# Patient Record
Sex: FEMALE | Race: WHITE | Employment: UNEMPLOYED
[De-identification: names, ages, dates, MRNs, and addresses within clinical notes are randomized per-mention and may not be internally consistent; named-entity substitution may affect disease eponyms.]

---

## 2017-11-12 ENCOUNTER — HEALTH MAINTENANCE LETTER (OUTPATIENT)
Age: 52
End: 2017-11-12

## 2019-04-22 ENCOUNTER — HOSPITAL ENCOUNTER (OUTPATIENT)
Dept: WOUND CARE | Facility: CLINIC | Age: 54
Discharge: HOME | End: 2019-04-22
Attending: SURGERY | Admitting: SURGERY
Payer: COMMERCIAL

## 2019-04-22 VITALS
DIASTOLIC BLOOD PRESSURE: 91 MMHG | RESPIRATION RATE: 16 BRPM | HEIGHT: 65 IN | HEART RATE: 103 BPM | BODY MASS INDEX: 25.43 KG/M2 | WEIGHT: 152.6 LBS | TEMPERATURE: 99 F | SYSTOLIC BLOOD PRESSURE: 158 MMHG

## 2019-04-22 DIAGNOSIS — S81.801A: ICD-10-CM

## 2019-04-22 DIAGNOSIS — S81.801A TRAUMATIC OPEN WOUND OF RIGHT LOWER LEG, INITIAL ENCOUNTER: ICD-10-CM

## 2019-04-22 DIAGNOSIS — S81.801S TRAUMATIC OPEN WOUND OF RIGHT LOWER LEG, SEQUELA: ICD-10-CM

## 2019-04-22 DIAGNOSIS — S81.801A TRAUMATIC OPEN WOUND OF LOWER LEG, RIGHT, INITIAL ENCOUNTER: ICD-10-CM

## 2019-04-22 PROCEDURE — 11042 DBRDMT SUBQ TIS 1ST 20SQCM/<: CPT

## 2019-04-22 PROCEDURE — 11042 DBRDMT SUBQ TIS 1ST 20SQCM/<: CPT | Performed by: SURGERY

## 2019-04-22 PROCEDURE — A6235 HYDROCOLLD DRG >16<=48 W/O B: HCPCS

## 2019-04-22 PROCEDURE — G0463 HOSPITAL OUTPT CLINIC VISIT: HCPCS | Mod: 25

## 2019-04-22 PROCEDURE — 99203 OFFICE O/P NEW LOW 30 MIN: CPT | Mod: 25 | Performed by: SURGERY

## 2019-04-22 PROCEDURE — 93923 UPR/LXTR ART STDY 3+ LVLS: CPT

## 2019-04-22 RX ORDER — MUPIROCIN 20 MG/G
OINTMENT TOPICAL
Refills: 2 | COMMUNITY
Start: 2019-04-17 | End: 2021-07-26

## 2019-04-22 ASSESSMENT — MIFFLIN-ST. JEOR: SCORE: 1293.07

## 2019-04-22 NOTE — PROGRESS NOTES
"[facility] [place] Progress Note    Subject: Dilma Navarro was referred by Dr. Castillo for evaluation of the right posterior calf traumatic ulcer.  This 53-year-old patient overall excellent health with no underlying PAD or venous issues in her right leg [place] airport [place] and was hit by a luggage cart in the back of her calf.  She was wearing long pants and had significant bruising but no open wounds.  This was sore and swollen but no evidence of infection.  Approximately 3 weeks ago while scrubbing the area with a Loofa to small ulcers develop.  She saw dermatology who recommended Bactroban to the area and to be evaluated in our clinic.    PMH: Medications: Vitamins including magnesium, vitamin D3, calcium            Medical: Left calf DVT 2012following injury to her left ankle treated with a splint                               Short term of anticoagulation with no complications except for                               Mild chronic left distal calf and ankle edema for which she wears                                          Compression.                            Cholecystectomy-laparoscopic with no complications                          Cushing's disease.  Underwent trans-sphenoid resection in 2005                                    No chronic steroids.    12 point review of systems is unremarkable.  No history of diabetes, PAD, varicose veins.  History of DVT following trauma as described above with no clinical significance.    PMH: Noncontributory    Non-smoker.  Social history: As a home here in the [place] but also in [facility].  She plans on leaving for [place] for a 1 month stay later this week.                                Exam:  BP (!) 158/91   Pulse 103   Temp 99  F (37.2  C) (Temporal)   Resp 16   Ht 1.651 m (5' 5\")   Wt 69.2 kg (152 lb 9.6 oz)   BMI 25.39 kg/m    Alert and appropriate.  Normal affect.  Very conversant.  HEENT= normal  Chest= " clear to auscultation  Cardiovascular= regular rate with no murmur  Extremities= contused area in the right distal one third of posterior calf.  First ulcer measures 1 x 1.1 x 0.1 cm with the distal measuring 0.5 x 0.3 x 0.1 cm.  There is chronic bruising type changes particularly around the smaller distal ulcer.  However, no significant induration.  No cellulitis.  No edema in this leg.  +3 palpable dorsalis pedis pulse bilaterally.         Bedside Doppler triphasic waveforms in the dorsalis pedis and posterior tibial arteries bilaterally    We felt that excisional removal is indicated due to the chronicity of the ulcer.    Procedure:   Patient was determined to be capable of making their own medical decisions and informed consent was obtained. Topical anesthetic of 4% lidocaine was applied, area was prepped with Betadine and a field block of 10 mils of 0.5% plain Marcaine was performed with excellent results.  Debridement was performed using a #15 blade down to and including subcutaneous tissue.  We excised the nonviable eschar on both of the sites down to the superficial subcutaneous tissue with excellent bleeding.  Bleeding controlled with light pressure. Patient tolerated procedure well.    Proximal ulcer measures 1.5 x 1.6 x 0.2 cm and distal 0.8 x 0.6 x 0.2 cm post debridement.    Impression: Traumatic posterior calf ulcerations.  We have excised the obviously nonviable tissue.  I do have some concerns about the bruising particularly on the more distal site.  With the patient leaving for [place] later this week we felt that the debridement we performed today would be adequate to see if of the heel.  If the contused tissue cannot heal much wider debridement would be necessary and this would be reevaluated when she returns in 4 weeks since the wider excisional debridement would require very likely wound VAC and possibly skin grafting.  However, with her excellent blood supply, no significant venous or swelling  issues, and good compliance very likely that this may improve.    We will use Aquacel Ag over the sites followed by a Spandagrip or her compression stocking.  She may shower but not soak the wound.  She will apply the dressings daily.  No specific restrictions to activities as long as she is ambulatory.  No nutritional supplements would be necessary with this patient.    Plan: We will dress the wounds with Aquacel Ag.  If she does run out of this dressing for which we gave her 2 packets that should last multiple weeks she will go back to the Bactroban..  Patient will return to the clinic in 4 weeks time    Giovanni Salazar MD on 4/22/2019 at 8:22 AM

## 2019-04-22 NOTE — PROGRESS NOTES
Patient arrived for wound care visit. Certified Wound Care Nurse time spent evaluating patient record, completed a full evaluation and documented wound(s) & lexi-wound skin; provided recommendation based on treatment plan. Applied dressing, reviewed discharge instructions, patient education, and discussed plan of care with appropriate medical team staff members and patient and/or family members.

## 2019-04-22 NOTE — DISCHARGE INSTRUCTIONS
[facility]  [address] [facility] [address], [place] [address]  Appointment Phone [phone] Nurse Advisors [phone]    Dilma Navarro      1965    Wound Dressing Change to two wounds on the back of right leg  Cleanse wounds with soap and water, may shower   Cover wounds with Aquacel AG cut to fit the size of the wound (moisten with saline if dry wound). When you run out of the Aquacel AG you may resume your Mupirocin.  Cover wounds with gauze then wrap with roll gauze  Change dressing daily    Compression:   You have a compression wrap Spandagrip E is supposed to be removed at night and put back on first thing in the morning.   Please remove compression dressing if toes turn blue and/or tingle and can not be relieved by raising the leg for one hour.   Please raise your legs above your heart for 30 mins 3 times a day to promote wound healing.    A diet high in protein is important for wound healing, we recommend getting 90 grams of protein per day. Taking protein shakes or bars are a good way to get extra protein in your diet.     Good sources of protein:  Pork 26g per 3 oz  Whey protein powder - 24g per scoop (on average)  Greek yogurt - 23g per 8oz   Chicken or Turkey - 23g per 3oz  Fish - 20-25g per 3oz  Beef - 18-23g per 3oz  Navy beans - 20g per cup  Cottage cheese - 14g per 1/2 cup   Lentils - 13g per 1/4 cup  Beef jerky 13g per 1oz  2% milk - 8g per cup  Peanut butter - 8g per 2 tablespoons  Eggs - 6g per egg  Mixed nuts - 6g per 2oz     Giovanni Salazar M.D. April 22, 2019    Call us at [phone] if you have any questions about your wounds, have redness or swelling around your wound, have a fever of 101 or greater or if you have any other problems or concerns. We answer the phone Monday through Friday 8 am to 4 pm, please leave a message as we check the voicemail frequently throughout the day.     Follow up with Dr. Salazar when you get back in one  month

## 2019-05-01 ENCOUNTER — TELEPHONE (OUTPATIENT)
Dept: WOUND CARE | Facility: CLINIC | Age: 54
End: 2019-05-01

## 2019-05-01 NOTE — TELEPHONE ENCOUNTER
Patient called earlier this week wondering about the aquacel ag drying out. Advised Patient to moisten with saline and continue with plan of care.  Patient calls today to request to send picture of wound as it is now red with a little yellow on the area. She wants to know if this is normal as [place]. Advised to attempt to remove yellow with washing of wound and continue with current treatment this is an expected outcome. We are not able to accept pictures at this time due to [place].

## 2019-06-03 ENCOUNTER — HOSPITAL ENCOUNTER (OUTPATIENT)
Dept: WOUND CARE | Facility: CLINIC | Age: 54
Discharge: HOME | End: 2019-06-03
Attending: PHYSICIAN ASSISTANT | Admitting: PHYSICIAN ASSISTANT
Payer: COMMERCIAL

## 2019-06-03 VITALS
RESPIRATION RATE: 18 BRPM | DIASTOLIC BLOOD PRESSURE: 110 MMHG | TEMPERATURE: 98.1 F | HEART RATE: 102 BPM | SYSTOLIC BLOOD PRESSURE: 179 MMHG

## 2019-06-03 DIAGNOSIS — S81.801S TRAUMATIC OPEN WOUND OF RIGHT LOWER LEG, SEQUELA: ICD-10-CM

## 2019-06-03 PROCEDURE — 11042 DBRDMT SUBQ TIS 1ST 20SQCM/<: CPT

## 2019-06-03 PROCEDURE — A6235 HYDROCOLLD DRG >16<=48 W/O B: HCPCS

## 2019-06-03 PROCEDURE — 11042 DBRDMT SUBQ TIS 1ST 20SQCM/<: CPT | Performed by: SURGERY

## 2019-06-03 NOTE — PROGRESS NOTES
[facility] [place] Progress Note    Subject: Dilma Navarro returns for her first follow-up.  We saw her initially for posterior calf ulcers that were traumatic in nature on 4/22/2019.  Evidence of excellent blood supply and no venous issues.  Wounds were debrided that time and she was started on Aquacel Ag.    She was leaving for [place] so shortly after the visit and just returned to her home in the [facility].  She ran out of Aquacel and was using Bactroban that she got in [place].  She has been using a Spandagrip but has had no swelling issues.    Patient Active Problem List   Diagnosis     Blood clot in vein     Stress fracture of foot     CARDIOVASCULAR SCREENING; LDL GOAL LESS THAN 160     Cushing's syndrome (H)     Traumatic open wound of right lower leg     Past Medical History:   Diagnosis Date     Blood clot in vein 09/18/2012    2 blood clots in L calf     Cushing's syndrome (H) 2005     Exam:  BP (!) 179/110   Pulse 102   Temp 98.1  F (36.7  C) (Temporal)   Resp 18   Wound (used by OP [facility] only) 04/22/19 0817 Right posterior;lower leg ulceration, venous (Active)   Length (cm) 1.5 6/3/2019 10:15 AM   Width (cm) 1.5 6/3/2019 10:15 AM   Depth (cm) 0.2 6/3/2019 10:15 AM   Wound (cm^2) 2.25 cm^2 6/3/2019 10:15 AM   Wound Volume (cm^3) 0.45 cm^3 6/3/2019 10:15 AM   Wound healing % -104.55 6/3/2019 10:15 AM   Dressing Appearance moist drainage 6/3/2019 10:15 AM   Drainage Characteristics/Odor serosanguineous 6/3/2019 10:15 AM   Drainage Amount moderate 6/3/2019 10:15 AM       Wound (used by OP [facility] only) 04/22/19 0817 Right lower;posterior;distal leg ulceration, venous (Active)   Length (cm) 2.5 6/3/2019 10:15 AM   Width (cm) 2.5 6/3/2019 10:15 AM   Depth (cm) 0.2 6/3/2019 10:15 AM   Wound (cm^2) 6.25 cm^2 6/3/2019 10:15 AM   Wound Volume (cm^3) 1.25 cm^3 6/3/2019 10:15 AM   Wound healing % -4066.67 6/3/2019 10:15 AM   Dressing Appearance moist drainage 6/3/2019 10:15 AM   Drainage  Characteristics/Odor serosanguineous 6/3/2019 10:15 AM   Drainage Amount moderate 6/3/2019 10:15 AM     Alert and appropriate.  Very comfortable.  No edema.  No cellulitis.  Palpable dorsalis pedis pulse.  Proximal distal calf wound measures 1.5 x 1.5 x 0.2 cm.  Good granulation tissue and a mild amount of slough.  No undermining.  The adjacent distal wound has actually somewhat increased.  This most likely is due to the crush tissue that was nonviable.  This particular wound has more nonviable tissue.  I again no undermining or cellulitis.  Surrounding skin tissue actually does look good.    Procedure:   Patient was determined to be capable of making their own medical decisions and informed consent was obtained. Topical anesthetic of 4% lidocaine was applied, debridement was performed using a #15 blade down to and including subcutaneous tissue.  All slough and fibrin removed from both wounds.  Nonviable tissue more aggressively debrided on the distal wound and skin edges trimmed 0.1 cm circumferentially.  Good bleeding was noted.  No undermining.  Granulation tissue the bed actually is quite firm.  Bleeding controlled with light pressure. Patient tolerated procedure well.    Impression: Some enlargement of the distal wound.  We have excised all nonviable tissue.  Granulation tissue is healthy with good perfusion and no significant edema.  We will use Aquacel Ag on the wounds until supply we gave her today is finished.  Ventricle back to Bactroban.  We will see her again in approximately 2 weeks.  The Spandagrip is optional.  She may shower but should not soak.  Nutritional intake is already good.    Plan: We will dress the wounds with Aquacel Ag changing over to Bactroban.  Depending on the ulcer on the next visit we will decide whether a collagen is indicated..  Patient will return to the clinic in 2 weeks time    Giovanni Salazar MD on 6/3/2019 at 10:54 AM

## 2019-06-03 NOTE — DISCHARGE INSTRUCTIONS
[facility]  [address] [facility] [address] [address]  Appointment Phone [phone] Nurse Advisors [phone]    Dilma Navarro      1965    Wound Dressing Change to right lower leg ulcer  Cleanse wound and surrounding skin with soap and water  May apply Dermaliour ointment to surrounding skin  Cover wound with Aquacel AG cover with Medipore pad  Change dressing daily    A diet high in protein is important for wound healing, we recommend getting 90 grams of protein per day. Taking protein shakes or bars are a good way to get extra protein in your diet.     Good sources of protein:  Pork 26g per 3 oz  Whey protein powder - 24g per scoop (on average)  Greek yogurt - 23g per 8oz   Chicken or Turkey - 23g per 3oz  Fish - 20-25g per 3oz  Beef - 18-23g per 3oz  Navy beans - 20g per cup  Cottage cheese - 14g per 1/2 cup   Lentils - 13g per 1/4 cup  Beef jerky 13g per 1oz  2% milk - 8g per cup  Peanut butter - 8g per 2 tablespoons  Eggs - 6g per egg  Mixed nuts - 6g per 2oz     Giovanni Salazar M.D.. Holley 3, 2019    Call us at [phone] if you have any questions about your wounds, have redness or swelling around your wound, have a fever of 101 or greater or if you have any other problems or concerns. We answer the phone Monday through Friday 8 am to 4 pm, please leave a message as we check the voicemail frequently throughout the day.     Follow up with Dr. Salazar in 2 weeks

## 2019-06-17 ENCOUNTER — HOSPITAL ENCOUNTER (OUTPATIENT)
Dept: WOUND CARE | Facility: CLINIC | Age: 54
Discharge: HOME | End: 2019-06-17
Attending: PHYSICIAN ASSISTANT | Admitting: PHYSICIAN ASSISTANT
Payer: COMMERCIAL

## 2019-06-17 VITALS
DIASTOLIC BLOOD PRESSURE: 92 MMHG | TEMPERATURE: 98.4 F | RESPIRATION RATE: 14 BRPM | HEART RATE: 96 BPM | SYSTOLIC BLOOD PRESSURE: 152 MMHG

## 2019-06-17 DIAGNOSIS — S81.801S TRAUMATIC OPEN WOUND OF RIGHT LOWER LEG, SEQUELA: ICD-10-CM

## 2019-06-17 PROCEDURE — A6022 COLLAGEN DRSG>16<=48 SQ IN: HCPCS

## 2019-06-17 PROCEDURE — 11042 DBRDMT SUBQ TIS 1ST 20SQCM/<: CPT

## 2019-06-17 PROCEDURE — 11042 DBRDMT SUBQ TIS 1ST 20SQCM/<: CPT | Performed by: SURGERY

## 2019-06-17 NOTE — PROGRESS NOTES
[facility] Wound [facility] [place] Progress Note    Subject: Dilma Navarro returns for follow-up of her right posterior calf ulcers.  This is related to trauma.  She has no underlying arterial or venous insufficiency.  We saw her initially for 1 visit before she spent a month in [place].  When she returned 2 weeks ago we were able to do a more aggressive debridement again.  She had developed a second ulcer during the time she was gone.  She has been using Aquacel Ag and compression which has been helpful.    Patient Active Problem List   Diagnosis     Blood clot in vein     Stress fracture of foot     CARDIOVASCULAR SCREENING; LDL GOAL LESS THAN 160     Cushing's syndrome (H)     Traumatic open wound of right lower leg     Past Medical History:   Diagnosis Date     Blood clot in vein 09/18/2012    2 blood clots in L calf     Cushing's syndrome (H) 2005     Exam:  BP (!) 152/92   Pulse 96   Temp 98.4  F (36.9  C) (Tympanic)   Resp 14   Wound (used by OP [facility] only) 04/22/19 0817 Right posterior;lower leg ulceration, venous (Active)   Length (cm) 1 6/17/2019 10:00 AM   Width (cm) 0.8 6/17/2019 10:00 AM   Depth (cm) 0.1 6/17/2019 10:00 AM   Wound (cm^2) 0.8 cm^2 6/17/2019 10:00 AM   Wound Volume (cm^3) 0.08 cm^3 6/17/2019 10:00 AM   Wound healing % 27.27 6/17/2019 10:00 AM       Wound (used by OP [facility] only) 04/22/19 0817 Right lower;posterior;distal leg ulceration, venous (Active)   Length (cm) 2.1 6/17/2019 10:00 AM   Width (cm) 2.1 6/17/2019 10:00 AM   Depth (cm) 0.2 6/17/2019 10:00 AM   Wound (cm^2) 4.41 cm^2 6/17/2019 10:00 AM   Wound Volume (cm^3) 0.88 cm^3 6/17/2019 10:00 AM   Wound healing % -2840 6/17/2019 10:00 AM     No complaints.  No swelling or cellulitis to right leg.  2 ulcerations are noted.  Proximal continues to decrease now measuring 1 x 0.8 x 0.1 cm.  Distal measures 2.1 x 2.1 x 0.2 cm with more slough and fibrin and a small amount of whitish tissue noted reticular superiorly.  No  undermining.  Surrounding skin is normal.    Procedure:   Patient was determined to be capable of making their own medical decisions and informed consent was obtained. Topical anesthetic of 4% lidocaine was applied, debridement was performed using a #15 blade down to and including subcutaneous tissue.  All slough and fibrin removed from both of the wounds.  On the distal wound all whitish tissue excised.  Underlying tissue looks good.  Skin edges slightly excised circumferentially and the larger ulcer with good healthy epithelium and dermis noted.  Bleeding controlled with light pressure. Patient tolerated procedure well.    Impression: Continued improvement.  Aquac Ag several days ago and is been using Bactroban on both wounds.  On the very superficial proximal wound we will use Bactroban but go to a Fibracol moistened with saline to change this daily.  Discussed why we would use Fibracol on the need to keep it moist at all times.  She has no troubles at all with her own dressing changes.    Plan: We will dress the wounds with Fibracol on the larger ulcer and Bactroban on the almost healed proximal ulcer..  Patient will return to the clinic in 2 weeks time    Giovanni Salazar MD on 6/17/2019 at 11:16 AM

## 2019-06-17 NOTE — DISCHARGE INSTRUCTIONS
[facility]  [address] [facility] [address], [place] [address]  Appointment Phone [phone] Nurse Advisors [phone]    Dilma Navarro      1965    Wound Dressing Change to right posterior lower leg ulcer  Cleanse wound with soap and water  Cover wound with Fibracol cut to fit the size of the wound (moisten with saline if dry wound)  Cover wound with gauze  Change dressing daily    Apply Bactroban to top wound, cover with gauze and change daily     Giovanni Salazar M.D.. June 17, 2019    Call us at [phone] if you have any questions about your wounds, have redness or swelling around your wound, have a fever of 101 or greater or if you have any other problems or concerns. We answer the phone Monday through Friday 8 am to 4 pm, please leave a message as we check the voicemail frequently throughout the day.     Follow up with Dr. Salazar in 2 weeks

## 2019-07-01 ENCOUNTER — HOSPITAL ENCOUNTER (OUTPATIENT)
Dept: WOUND CARE | Facility: CLINIC | Age: 54
Discharge: HOME | End: 2019-07-01
Attending: SURGERY | Admitting: SURGERY
Payer: COMMERCIAL

## 2019-07-01 VITALS
RESPIRATION RATE: 16 BRPM | SYSTOLIC BLOOD PRESSURE: 155 MMHG | HEART RATE: 87 BPM | TEMPERATURE: 97.2 F | DIASTOLIC BLOOD PRESSURE: 97 MMHG

## 2019-07-01 DIAGNOSIS — S81.801S TRAUMATIC OPEN WOUND OF RIGHT LOWER LEG, SEQUELA: ICD-10-CM

## 2019-07-01 PROCEDURE — A6022 COLLAGEN DRSG>16<=48 SQ IN: HCPCS

## 2019-07-01 PROCEDURE — 11042 DBRDMT SUBQ TIS 1ST 20SQCM/<: CPT

## 2019-07-01 PROCEDURE — 11042 DBRDMT SUBQ TIS 1ST 20SQCM/<: CPT | Performed by: SURGERY

## 2019-07-01 NOTE — PROGRESS NOTES
[facility]ound [facility] [place] Progress Note    Subject: Dilma Navarro returns for follow-up of her traumatic left posterior calf ulcers.  She developed the 2 ulcers and the proximal is completely healed over.  The distal ulcer measured 2.1 x 2.1 x 0.2 cm on her last visit on 6/17/2019.  The proximal wound was smaller.  We treated the proximal wound with Bactroban and then went to a Fibracol on the larger wound.  She is changing this daily.  No specific complaints.    Patient Active Problem List   Diagnosis     Blood clot in vein     Stress fracture of foot     CARDIOVASCULAR SCREENING; LDL GOAL LESS THAN 160     Cushing's syndrome (H)     Traumatic open wound of right lower leg     Past Medical History:   Diagnosis Date     Blood clot in vein 09/18/2012    2 blood clots in L calf     Cushing's syndrome (H) 2005     Exam:  BP (!) 155/97   Pulse 87   Temp 97.2  F (36.2  C) (Tympanic)   Resp 16   Wound (used by OP [facility] only) 04/22/19 0817 Right lower;posterior;distal leg ulceration, venous (Active)   Length (cm) 2.4 7/1/2019 10:00 AM   Width (cm) 2 7/1/2019 10:00 AM   Depth (cm) 0.3 7/1/2019 10:00 AM   Wound (cm^2) 4.8 cm^2 7/1/2019 10:00 AM   Wound Volume (cm^3) 1.44 cm^3 7/1/2019 10:00 AM   Wound healing % -3100 7/1/2019 10:00 AM   Dressing Appearance moist drainage 7/1/2019 10:00 AM   Drainage Characteristics/Odor serosanguineous 7/1/2019 10:00 AM   Drainage Amount moderate 7/1/2019 10:00 AM     Alert and appropriate.  Very comfortable.  Proximal posterior calf ulcer is completely epithelialized over and looks excellent.  Distal ulcer has much better granulation tissue filling at least 90% of the wound and slightly less healthy at the 2 to 4 o'clock position where there is a thicker layer of whitish fibrin.  Skin edges are somewhat rolled on this edge.  Very slight undermining is noted.    Procedure:   Patient was determined to be capable of making their own medical decisions and informed consent was  obtained. Topical anesthetic of 4% lidocaine was applied, debridement was performed using a #15 blade down to and including subcutaneous tissue.  Excised all skin edges particular the 2 to 4 o'clock position so there was no undermining and fresh epithelium.  Good bleeding was noted.  All slough and fibrin removed from the entire base which has excellent firm leading granulation tissue.  Bleeding controlled with light pressure. Patient tolerated procedure well.    Impression: Overall continued improvement.  Re-debridement today particular the skin edges and base to encourage epithelialization now that the base has granulated.  Will continue with the daily Fibracol.  She is had to moisten this recently due to less drainage to activate the Fibracol with usually is completely reabsorbed when she changes this in the morning.    Plan: We will dress the wounds with Fibracol daily..  Patient will return to the clinic in 2 weeks time    Giovanni Salazar MD on 7/1/2019 at 10:42 AM

## 2019-07-01 NOTE — DISCHARGE INSTRUCTIONS
[facility]  [address] [facility] [address], [facility] [address]  Appointment Phone [phone] Nurse Advisors [phone]    Dilma Navarro 1965    Wound Dressing Change to right posterior lower leg ulcer  Cleanse wound with soap and water  Cover wound with Fibracol cut to fit the size of the wound (moisten with saline if dry  wound)  Cover wound with gauze  Change dressing daily    Giovanni Salazar M.D. July 1, 2019    Call us at [phone] if you have any questions about your wounds, have redness or  swelling around your wound, have a fever of 101 or greater or if you have any other  problems or concerns. We answer the phone Monday through Friday 8 am to 4 pm,  please leave a message as we check the voicemail frequently throughout the day.    Follow up with Dr. Salazar in 2 weeks   Bronchodilators neb prn  Pfts as OP.

## 2019-10-28 ENCOUNTER — TELEPHONE (OUTPATIENT)
Dept: WOUND CARE | Facility: CLINIC | Age: 54
End: 2019-10-28

## 2020-03-02 ENCOUNTER — HEALTH MAINTENANCE LETTER (OUTPATIENT)
Age: 55
End: 2020-03-02

## 2020-04-07 ENCOUNTER — TELEPHONE (OUTPATIENT)
Dept: WOUND CARE | Facility: CLINIC | Age: 55
End: 2020-04-07

## 2020-04-07 NOTE — TELEPHONE ENCOUNTER
Returned call, spoke with Brina.  Made appointment for 4/13/2020.  Expressed concern that wound may be infected, discussed signs and symptoms of infection.  Advised to seek further treatment at ED if feels wound is worsening.  Understanding expressed, no further questions at this time.

## 2020-04-07 NOTE — TELEPHONE ENCOUNTER
Wound like to speak to Dr Salazar regarding her new wound but does not want to wait for an appointment.  [phone]

## 2020-04-13 ENCOUNTER — HOSPITAL ENCOUNTER (OUTPATIENT)
Dept: WOUND CARE | Facility: CLINIC | Age: 55
Discharge: HOME | End: 2020-04-13
Attending: SURGERY | Admitting: SURGERY
Payer: COMMERCIAL

## 2020-04-13 VITALS
DIASTOLIC BLOOD PRESSURE: 94 MMHG | TEMPERATURE: 97.8 F | RESPIRATION RATE: 18 BRPM | SYSTOLIC BLOOD PRESSURE: 155 MMHG | HEART RATE: 112 BPM

## 2020-04-13 DIAGNOSIS — L97.922 ULCER OF LEFT LOWER EXTREMITY WITH FAT LAYER EXPOSED (H): ICD-10-CM

## 2020-04-13 PROBLEM — Z86.718 HISTORY OF DVT (DEEP VEIN THROMBOSIS): Status: ACTIVE | Noted: 2019-05-30

## 2020-04-13 PROBLEM — M85.80 OSTEOPENIA: Status: ACTIVE | Noted: 2019-05-30

## 2020-04-13 PROCEDURE — G0463 HOSPITAL OUTPT CLINIC VISIT: HCPCS | Mod: 25

## 2020-04-13 PROCEDURE — 99213 OFFICE O/P EST LOW 20 MIN: CPT | Mod: 25 | Performed by: SURGERY

## 2020-04-13 PROCEDURE — 11042 DBRDMT SUBQ TIS 1ST 20SQCM/<: CPT

## 2020-04-13 PROCEDURE — 11042 DBRDMT SUBQ TIS 1ST 20SQCM/<: CPT | Performed by: SURGERY

## 2020-04-13 NOTE — DISCHARGE INSTRUCTIONS
[facility]  [address] [address]    Call us at [phone] if you have any questions about your wounds, have redness or swelling around your wound, have a fever of 101 or greater or if you have any other problems or concerns. We answer the phone Monday through Friday 8 am to 4 pm, please leave a message as we check the voicemail frequently throughout the day.     Dilma Navarro      1965    Aquacel AG Wound Dressing Change: Left leg  Cleanse wound with soap and water   Apply critic aid to skin surrounding the wound (but not in the wound)  Cover wound with Aquacel AG cut to fit the size of the wound (moisten with saline if dry wound)  Wrap or cover wound with gauze, foam dressing, ABD pad  Change dressing daily         Giovanni Salazar M.D.. April 13, 2020  Appointments for you to make:  Wound Healing Clinic  Follow up with Provider - 1 week  [phone]

## 2020-04-13 NOTE — ADDENDUM NOTE
Encounter addended by: Veronica Miramontes RN on: 4/13/2020 12:06 PM   Actions taken: Diagnosis association updated, Order list changed

## 2020-04-13 NOTE — PROGRESS NOTES
[facility] Wound [facility]aling [place] Progress Note    Subject: Dilma Navarro is a 54-year-old patient overall very good health who we know from a traumatic ulceration on her right calf that is subsequently healed.  Last visit with us was on 10/28/2019 and she has had no further problems.    Ever, approximately 3 weeks ago she was pushing her grocery cart in a parking lot and hit a pothole where she slipped and fell suffering a traumatic oblique laceration to her left mid anterior calf.  Great deal of bleeding and bruising.  Went to urgent care where this was sutured.  Except for the bruising this looks fine and sutures removed by her primary care physician 1 week later.  Unfortunately, she developed further wound breakdown.  No signs of systemic sepsis.  Comes for follow-up and treatment at the wound clinic today.    Patient Active Problem List   Diagnosis     Blood clot in vein     Stress fracture of foot     CARDIOVASCULAR SCREENING; LDL GOAL LESS THAN 160     Cushing's syndrome (H)     Traumatic open wound of right lower leg     Hirsutism     History of Cushing disease     History of DVT (deep vein thrombosis)     Osteopenia     Osteoporosis     Ulcer of left lower extremity with fat layer exposed (H)     Past Medical History:   Diagnosis Date     Blood clot in vein 09/18/2012    2 blood clots in L calf     Cushing's syndrome (H) 2005     Exam:  BP (!) 155/94 (BP Location: Left arm)   Pulse 112   Temp 97.8  F (36.6  C)   Resp 18   Wound (used by OP [facility] only) 04/13/20 0825 Left anterior;lower leg trauma (Active)   Length (cm) 7.8 04/13/20 0800   Width (cm) 6 04/13/20 0800   Depth (cm) 0.6 04/13/20 0800   Wound (cm^2) 46.8 cm^2 04/13/20 0800   Wound Volume (cm^3) 28.08 cm^3 04/13/20 0800   Dressing Appearance moist drainage 04/13/20 0800   Drainage Characteristics/Odor serosanguineous 04/13/20 0800   Drainage Amount moderate 04/13/20 0800   Thickness/Stage full thickness 04/13/20 0800   Base necrotic  04/13/20 0800   Periwound redness;swelling 04/13/20 0800   Periwound Temperature warm 04/13/20 0800   Periwound Skin Turgor soft 04/13/20 0800   Edges open 04/13/20 0800   Care, Wound debrided 04/13/20 0800     Alert and appropriate.  Very comfortable.  Chest= clear  Cardiovascular= regular rate  +3 palpable left dorsalis pedis and posterior tibial pulse.  Right posterior calf ulcer is completely healed over and looks excellent.  Necrotic shoe located over the left mid tibial area with surrounding erythema.  Mild edema.  Normal sensation.  Ulcerated area measures 7.8 x 6 cm with a maximum depth of 0.6 cm.    Procedure:   Patient was determined to be capable of making their own medical decisions and informed consent was obtained. Topical anesthetic of 4% lidocaine was applied, debridement was performed using a #15 blade down to and including subcutaneous tissue.  All nonviable tissue was excised including the skin edges.  Some sensitivity in the skin edges particular at the 3 and 4 o'clock position.  Otherwise tolerated debridement well.  Debrided into the deep subcutaneous tissue removing the old hematoma.  Does not penetrate fascia.  Bleeding controlled with light pressure. Patient tolerated procedure well.    Impression: Traumatic left calf ulceration.  Skin necrosis associate the trauma.  Excellent arterial blood supply no evidence of venous insufficiency.  Extensive debridement performed today.  May have some mild cellulitis but do not feel systemic antibiotics are indicated.  We will dress the wound with Aquacel Ag that she will change on a daily basis along with a Spandagrip size E.  She may shower.  She knows the importance of good nutrition.    Plan: We will dress the wounds with Aquasol AG daily.  Patient will return to the clinic in 1 weeks time    Giovanni Salazar MD on 4/13/2020 at 8:55 AM

## 2020-04-14 ENCOUNTER — TELEPHONE (OUTPATIENT)
Dept: OTHER | Facility: CLINIC | Age: 55
End: 2020-04-14

## 2020-04-14 DIAGNOSIS — L97.922 ULCER OF LEFT LOWER EXTREMITY WITH FAT LAYER EXPOSED (H): ICD-10-CM

## 2020-04-14 NOTE — TELEPHONE ENCOUNTER
DME order refused by [facility].  Will fax to [facility] for case management.

## 2020-04-20 ENCOUNTER — HOSPITAL ENCOUNTER (OUTPATIENT)
Dept: WOUND CARE | Facility: CLINIC | Age: 55
Discharge: HOME | End: 2020-04-20
Attending: SURGERY | Admitting: SURGERY
Payer: COMMERCIAL

## 2020-04-20 VITALS
HEART RATE: 107 BPM | DIASTOLIC BLOOD PRESSURE: 108 MMHG | TEMPERATURE: 97.2 F | RESPIRATION RATE: 19 BRPM | SYSTOLIC BLOOD PRESSURE: 159 MMHG

## 2020-04-20 DIAGNOSIS — L97.922 ULCER OF LEFT LOWER EXTREMITY WITH FAT LAYER EXPOSED (H): ICD-10-CM

## 2020-04-20 PROCEDURE — 11042 DBRDMT SUBQ TIS 1ST 20SQCM/<: CPT | Performed by: SURGERY

## 2020-04-20 PROCEDURE — 11042 DBRDMT SUBQ TIS 1ST 20SQCM/<: CPT

## 2020-04-20 NOTE — DISCHARGE INSTRUCTIONS
[facility]  [address] [address]    Call us at [phone] if you have any questions about your wounds, have redness or swelling around your wound, have a fever of 101 or greater or if you have any other problems or concerns. We answer the phone Monday through Friday 8 am to 4 pm, please leave a message as we check the voicemail frequently throughout the day.     Dilma Navarro      1965    Aquacel AG Wound Dressing Change: Left leg  Cleanse wound with soap and water  Apply critic aid to skin surrounding the wound (but not in the wound)  Cover wound with Aquacel AG cut to fit the size of the wound (moisten with saline if dry wound)  Wrap or cover wound with gauze, foam dressing, ABD pad  Change dressing daily  Compression:   You have a compression wrap spandigrip E is supposed to be removed at night and put back on first thing in the morning.   Please remove compression dressing if toes turn blue and/or tingle and can not be relieved by raising the leg for one hour.          Giovanni Salazar M.D.. April 20, 2020    Follow up with Provider - 1  Weeks

## 2020-04-20 NOTE — PROGRESS NOTES
[facility] Wound Healing [place] Progress Note    Subject: Dilma Navarro is overall excellent health.  She suffered a traumatic injury to her left tibial region when she slipped on a sharpening cart and hit a pothole.  On her visit 2 weeks ago she had a large amount of necrotic tissue that was excised.  Has been treating this with AquacelAG on a daily basis.      She had a traumatic ulceration in the past on her right calf which is completely healed over with no ongoing issues.    She has had very limited pain associated with this.  She has good nutrition.  Non-smoker.    Patient Active Problem List   Diagnosis     Blood clot in vein     Stress fracture of foot     CARDIOVASCULAR SCREENING; LDL GOAL LESS THAN 160     Cushing's syndrome (H)     Traumatic open wound of right lower leg     Hirsutism     History of Cushing disease     History of DVT (deep vein thrombosis)     Osteopenia     Osteoporosis     Ulcer of left lower extremity with fat layer exposed (H)     Past Medical History:   Diagnosis Date     Blood clot in vein 09/18/2012    2 blood clots in L calf     Cushing's syndrome (H) 2005     Exam:  BP (!) 159/108 (BP Location: Left arm)   Pulse 107   Temp 97.2  F (36.2  C) (Temporal)   Resp 19   Wound (used by OP [facility] only) 04/13/20 0825 Left anterior;lower leg trauma (Active)   Length (cm) 8 04/20/20 0800   Width (cm) 5 04/20/20 0800   Depth (cm) 0.5 04/20/20 0800   Wound (cm^2) 40 cm^2 04/20/20 0800   Wound Volume (cm^3) 20 cm^3 04/20/20 0800   Wound healing % 14.53 04/20/20 0800   Dressing Appearance moist drainage 04/20/20 0800   Drainage Characteristics/Odor serosanguineous 04/20/20 0800   Drainage Amount moderate 04/20/20 0800     Alert and appropriate.  Very comfortable.  Ulceration does look better though there are still some areas of necrotic subcutaneous tissue with underlying hemorrhage.  Skin edges are fairly well-defined and healthier.  Some periwound bruising but no infection.  No  undermining.  Seeing approximately 30 to 40% of the wound particular in the more proximal segment of healthy granulation tissue with mild overlying slough.    Procedure:   Patient was determined to be capable of making their own medical decisions and informed consent was obtained. Topical anesthetic of 4% lidocaine was applied, debridement was performed using a #15 blade down to and including subcutaneous tissue.  Excised the nonviable fatty tissue and underlying residual hematoma.  Skin edges were debrided circumferentially 0.1 cm down to healthy tissue.  All slough and fibrin removed from the rest of the wound.  Patient tolerated this quite well with the topical lidocaine.  Bleeding controlled with light pressure. Patient tolerated procedure well.    Impression: Continued improvement compared to last week where we excised a large amount of necrotic Subcutaneous tissue.  Further debridement performed today down to healthier tissue.  We will keep her on aqua cell AG to be changed daily.    Plan: We will dress the wounds with above  Patient will return to the clinic in 1 weeks time    Giovanni Salazar MD on 4/20/2020 at 8:41 AM

## 2020-04-27 ENCOUNTER — HOSPITAL ENCOUNTER (OUTPATIENT)
Dept: WOUND CARE | Facility: CLINIC | Age: 55
Discharge: HOME | End: 2020-04-27
Attending: SURGERY | Admitting: SURGERY
Payer: COMMERCIAL

## 2020-04-27 VITALS
SYSTOLIC BLOOD PRESSURE: 159 MMHG | DIASTOLIC BLOOD PRESSURE: 87 MMHG | RESPIRATION RATE: 18 BRPM | HEART RATE: 98 BPM | TEMPERATURE: 97.4 F

## 2020-04-27 DIAGNOSIS — L97.922 ULCER OF LEFT LOWER EXTREMITY WITH FAT LAYER EXPOSED (H): ICD-10-CM

## 2020-04-27 PROCEDURE — 11042 DBRDMT SUBQ TIS 1ST 20SQCM/<: CPT | Performed by: SURGERY

## 2020-04-27 PROCEDURE — 11042 DBRDMT SUBQ TIS 1ST 20SQCM/<: CPT

## 2020-04-27 PROCEDURE — A6235 HYDROCOLLD DRG >16<=48 W/O B: HCPCS

## 2020-04-27 RX ORDER — HYDROCODONE BITARTRATE AND ACETAMINOPHEN 5; 325 MG/1; MG/1
1 TABLET ORAL EVERY 6 HOURS PRN
Qty: 20 TABLET | Refills: 0 | Status: SHIPPED | OUTPATIENT
Start: 2020-04-27 | End: 2021-07-26

## 2020-04-27 NOTE — DISCHARGE INSTRUCTIONS
[facility]  [address] [address]    Call us at [phone] if you have any questions about your wounds, have redness or  swelling around your wound, have a fever of 101 or greater or if you have any other  problems or concerns. We answer the phone Monday through Friday 8 am to 4 pm,  please leave a message as we check the voicemail frequently throughout the day.    Dilma Navarro 1965    Aquacel AG Wound Dressing Change: Left leg  Cleanse wound with soap and water  Apply critic aid to skin surrounding the wound (but not in the wound) if needed  Cover wound with Aquacel AG cut to fit the size of the wound (moisten with saline if dry wound)  Cover wound with gauze  Change dressing daily    Compression:  You have a compression wrap spandigrip E is supposed to be removed at night and put back on first thing in the morning.  Please remove compression dressing if toes turn blue and/or tingle and can not be relieved by raising the leg for one hour.    Giovanni Salazar M.D. April 27, 2020    Follow up with Dr. Salazar in 1 week

## 2020-04-27 NOTE — PROGRESS NOTES
[facility] Wound [facility]aling [place] Progress Note    Subject: Dilma Navarro for a traumatic ulceration to her left calf.  Had skin necrosis associated with the trauma and underlying hematoma.  We have debrided her twice in the clinic to excise the nonviable tissue and underlying hematoma.  Debrided last week and kept on aqua cell AG change daily.    No underlying vascular or venous insufficiency problems.  Good nutrition.    She is taking Advil and Tylenol for the pain.  However difficulty sleeping due to the throbbing pain.  Discussed temporary use of narcotics to help her sleep at night.  Very reliable patient.      Patient Active Problem List   Diagnosis     Blood clot in vein     Stress fracture of foot     CARDIOVASCULAR SCREENING; LDL GOAL LESS THAN 160     Cushing's syndrome (H)     Traumatic open wound of right lower leg     Hirsutism     History of Cushing disease     History of DVT (deep vein thrombosis)     Osteopenia     Osteoporosis     Ulcer of left lower extremity with fat layer exposed (H)     Past Medical History:   Diagnosis Date     Blood clot in vein 09/18/2012    2 blood clots in L calf     Cushing's syndrome (H) 2005     Exam:  BP (!) 159/87 (BP Location: Left arm)   Pulse 98   Temp 97.4  F (36.3  C) (Temporal)   Resp 18   Wound (used by OP [facility] only) 04/13/20 0825 Left anterior;lower leg trauma (Active)   Length (cm) 8 04/27/20 0800   Width (cm) 5 04/27/20 0800   Depth (cm) 0.3 04/27/20 0800   Wound (cm^2) 40 cm^2 04/27/20 0800   Wound Volume (cm^3) 12 cm^3 04/27/20 0800   Wound healing % 14.53 04/27/20 0800   Dressing Appearance moist drainage 04/27/20 0800   Drainage Characteristics/Odor serosanguineous 04/27/20 0800   Drainage Amount moderate 04/27/20 0800   Thickness/Stage full thickness 04/27/20 0840   Base red/granulating;slough 04/27/20 0840   Periwound intact 04/27/20 0840   Periwound Temperature warm 04/27/20 0840   Care, Wound non-select wound debridement performed 04/27/20  0840     Alert and appropriate.  Ulceration continues to improve.  Still some nonviable subcutaneous tissue noted in a very small amount of hematoma in several areas.  Slight edge undermining and several portions but skin edges otherwise look good.  No cellulitis.    Procedure:   Patient was determined to be capable of making their own medical decisions and informed consent was obtained. Topical anesthetic of 4% lidocaine was applied, debridement was performed using a #15 blade down to and including subcutaneous tissue.  All slough and fibrin excised.  Skin edges slightly debrided down to healthier tissue to eliminate undermining.  All visible hematoma evacuated.  Seeing good healthy granulation tissue in many areas.  Bleeding controlled with light pressure. Patient tolerated procedure well.    Impression: Slow improvements noted.  Less aggressive debridement required today.  Seeing at least 70% of the wound with healthy granulation tissue.  We will continue with Aquacel Ag daily along with Spandagrip.    Plan: We will dress the wounds with daily Aquacel Ag.  Given a prescription for Norco 5/3/2025 1 tablet every 8 hours as needed #20.  Cautioned to use this very minimally primarily at night to help her sleep.  Continue with Tylenol and Advil  .  Patient will return to the clinic in 1 weeks time    Giovanni Salazar MD on 4/27/2020 at 8:45 AM

## 2020-05-04 ENCOUNTER — HOSPITAL ENCOUNTER (OUTPATIENT)
Dept: WOUND CARE | Facility: CLINIC | Age: 55
Discharge: HOME | End: 2020-05-04
Attending: SURGERY | Admitting: SURGERY
Payer: COMMERCIAL

## 2020-05-04 VITALS
DIASTOLIC BLOOD PRESSURE: 96 MMHG | HEART RATE: 89 BPM | SYSTOLIC BLOOD PRESSURE: 151 MMHG | RESPIRATION RATE: 18 BRPM | TEMPERATURE: 97.4 F

## 2020-05-04 DIAGNOSIS — L97.922 ULCER OF LEFT LOWER EXTREMITY WITH FAT LAYER EXPOSED (H): ICD-10-CM

## 2020-05-04 PROCEDURE — 11042 DBRDMT SUBQ TIS 1ST 20SQCM/<: CPT | Performed by: SURGERY

## 2020-05-04 PROCEDURE — A6022 COLLAGEN DRSG>16<=48 SQ IN: HCPCS

## 2020-05-04 PROCEDURE — 11042 DBRDMT SUBQ TIS 1ST 20SQCM/<: CPT

## 2020-05-04 NOTE — DISCHARGE INSTRUCTIONS
[facility]  [address] [address]    Call us at [phone] if you have any questions about your wounds, have redness or swelling around your wound, have a fever of 101 or greater or if you have any other problems or concerns. We answer the phone Monday through Friday 8 am to 4 pm, please leave a message as we check the voicemail frequently throughout the day.     Dilma Navarro      1965    Wound care recommendations to left lower leg ulcer  Clean wound with soap and water, apply Fibracol to wound base and cover with gauze. Change daily    Compression:  Apply clean compression Spandagrip E first thing in the morning and remove at night before going to bed.   Remove compression dressing if toes turn blue and/or start to feel numb and is not relieved by elevating the leg for one hour.     Dr. Giovanni Salazar, May 4, 2020    Wound Healing Clinic follow up with Dr. Salazar weekly

## 2020-05-04 NOTE — PROGRESS NOTES
[facility] [place] Progress Note    Subject: Dilma Navarro returns for follow-up of her traumatic left calf ulcer.  She has no underlying vascular insufficiency or swelling.  We have been debriding the wound weekly and she has been using Aquacel Ag.  Has no significant discomfort.  Nutrition has been good and she is taking supplements.  Also is taking Javier twice daily    Patient Active Problem List   Diagnosis     Blood clot in vein     Stress fracture of foot     CARDIOVASCULAR SCREENING; LDL GOAL LESS THAN 160     Cushing's syndrome (H)     Traumatic open wound of right lower leg     Hirsutism     History of Cushing disease     History of DVT (deep vein thrombosis)     Osteopenia     Osteoporosis     Ulcer of left lower extremity with fat layer exposed (H)     Past Medical History:   Diagnosis Date     Blood clot in vein 09/18/2012    2 blood clots in L calf     Cushing's syndrome (H) 2005     Exam:  BP (!) 151/96   Pulse 89   Temp 97.4  F (36.3  C) (Temporal)   Resp 18   Wound (used by OP [facility] only) 04/13/20 0825 Left anterior;lower leg trauma (Active)   Length (cm) 7.6 05/04/20 0800   Width (cm) 4.5 05/04/20 0800   Depth (cm) 0.4 05/04/20 0800   Wound (cm^2) 34.2 cm^2 05/04/20 0800   Wound Volume (cm^3) 13.68 cm^3 05/04/20 0800   Wound healing % 26.92 05/04/20 0800   Dressing Appearance moist drainage 05/04/20 0800   Drainage Characteristics/Odor serosanguineous 05/04/20 0800   Drainage Amount moderate 05/04/20 0800     Alert and appropriate.  Very comfortable.  Ulcerations better.  Approximately 70% excellent granulation tissues.  Still some fatty poorly vascularized subcutaneous tissue primarily in the edges.  No undermining.  No cellulitis.  No edema.  Excellent circulation.    Procedure:   Patient was determined to be capable of making their own medical decisions and informed consent was obtained. Topical anesthetic of 4% lidocaine was applied, debridement was performed using a #15  blade down to and including subcutaneous tissue.  All slough and fibrin excised including a small amount of skin edges with good healthy bleeding tissue noted following debridement.  Bleeding controlled with light pressure. Patient tolerated procedure well.    Impression: Continued improvement is noted.  We will switch to Fibracol that she will change daily.  Spandagrip to hold this in place.  Continue with nutritional supplements including Javier.    Plan: We will dress the wounds with Fibracol daily-moisten as needed..  Patient will return to the clinic in 1 weeks time    Giovanni Salazar MD on 5/4/2020 at 8:50 AM

## 2020-05-11 ENCOUNTER — HOSPITAL ENCOUNTER (OUTPATIENT)
Dept: WOUND CARE | Facility: CLINIC | Age: 55
Discharge: HOME | End: 2020-05-11
Attending: SURGERY | Admitting: SURGERY
Payer: COMMERCIAL

## 2020-05-11 VITALS
TEMPERATURE: 97.2 F | DIASTOLIC BLOOD PRESSURE: 87 MMHG | HEART RATE: 82 BPM | RESPIRATION RATE: 18 BRPM | SYSTOLIC BLOOD PRESSURE: 131 MMHG

## 2020-05-11 DIAGNOSIS — L97.922 ULCER OF LEFT LOWER EXTREMITY WITH FAT LAYER EXPOSED (H): ICD-10-CM

## 2020-05-11 PROCEDURE — A6022 COLLAGEN DRSG>16<=48 SQ IN: HCPCS

## 2020-05-11 PROCEDURE — 11042 DBRDMT SUBQ TIS 1ST 20SQCM/<: CPT

## 2020-05-11 PROCEDURE — 11042 DBRDMT SUBQ TIS 1ST 20SQCM/<: CPT | Performed by: SURGERY

## 2020-05-11 NOTE — DISCHARGE INSTRUCTIONS
[facility]  [address] [address]    Call us at [phone] if you have any questions about your wounds, have redness or  swelling around your wound, have a fever of 101 or greater or if you have any other  problems or concerns. We answer the phone Monday through Friday 8 am to 4 pm,  please leave a message as we check the voicemail frequently throughout the day.    Dilma Navarro 1965    Wound care recommendations to left lower leg ulcer  Clean wound with soap and water, apply Fibracol to wound base then Spandage followed by gauze. If you run out of Fibracol, use Endoform samples that you were given in clinic. Change daily    Dr. Giovanni Salazar, May 11, 2020    Wound Healing Clinic follow up with Dr. Salazar in 1 and 3 weeks

## 2020-05-11 NOTE — PROGRESS NOTES
[facility] Wound Healing [place] Progress Note    Subject: Dilma Navarro returns for follow-up of her traumatic left anterior lateral mid calf ulcer.  No underlying medical issues or vascular insufficiency.  Last seen on 5/4/2020 where the wound was debrided.  Worsening much improved granulation tissue at that time.  Switch her over to Fibracol to be changed daily.    Fibracol is not covered by her insurance and she is paying out-of-pocket.  When she lasts her 2 days.  Did notice some soreness when she was using the Spandagrip to hold the dressings in place and thus using tape.    Good nutrition and supplements.    Patient Active Problem List   Diagnosis     Blood clot in vein     Stress fracture of foot     CARDIOVASCULAR SCREENING; LDL GOAL LESS THAN 160     Cushing's syndrome (H)     Traumatic open wound of right lower leg     Hirsutism     History of Cushing disease     History of DVT (deep vein thrombosis)     Osteopenia     Osteoporosis     Ulcer of left lower extremity with fat layer exposed (H)     Past Medical History:   Diagnosis Date     Blood clot in vein 09/18/2012    2 blood clots in L calf     Cushing's syndrome (H) 2005     Exam:  /87 (BP Location: Right arm)   Pulse 82   Temp 97.2  F (36.2  C) (Temporal)   Resp 18   Wound (used by OP [facility] only) 04/13/20 0825 Left anterior;lower leg trauma (Active)   Length (cm) 7.6 05/11/20 0900   Width (cm) 4.9 05/11/20 0900   Depth (cm) 0.4 05/11/20 0900   Wound (cm^2) 37.24 cm^2 05/11/20 0900   Wound Volume (cm^3) 14.9 cm^3 05/11/20 0900   Wound healing % 20.43 05/11/20 0900   Dressing Appearance moist drainage 05/11/20 0900   Drainage Characteristics/Odor serosanguineous 05/11/20 0900   Drainage Amount moderate 05/11/20 0900     Alert and appropriate.  Very comfortable.  Very minimal calf edema.  No cellulitis.  Less fibrinous slough is noted in the wound with healthy granulation bed.  No undermining.    Procedure:   Patient was determined to  be capable of making their own medical decisions and informed consent was obtained. Topical anesthetic of 4% lidocaine was applied, debridement was performed using a #15 blade down to and including subcutaneous tissue.  All slough and fibrin removed from the wound bed and skin edges debrided 0.1 cm circumferentially.  Following debridement a excellent granulation bed cover the entire ulcer in the 2 sites.  There is a bridge of epithelium between the 2 ulcers that has matured nicely.  This is well documented in the photo.  Bleeding controlled with light pressure. Patient tolerated procedure well.    Impression: Continued improvement.  Less fibrin and slough and good healthy granulation.  We will continue with the Fibracol that she purchase and we also gave her a sample of endoform.  May use a Spandagrip to help hold this in place since it is less sore.  Continue with nutritional supplements.    Plan: We will dress the wounds with Fibracol followed by endoform to be changed daily..  Patient will return to the clinic in 1 weeks time (since we will be closed in 2 weeks due to the Memorial Day holiday and I do not want her to go 3 weeks without debridement).    We did discuss the possibility split-thickness skin grafting.  Presently she is not interested in this.  She does know that a wound this size may take some time to heal but she did very well with the ulcer on her right calf in the past.    Giovanni Salazar MD on 5/11/2020 at 9:44 AM

## 2020-05-15 ENCOUNTER — TELEPHONE (OUTPATIENT)
Dept: OTHER | Facility: CLINIC | Age: 55
End: 2020-05-15

## 2020-05-15 DIAGNOSIS — L97.922 ULCER OF LEFT LOWER EXTREMITY WITH FAT LAYER EXPOSED (H): ICD-10-CM

## 2020-05-15 NOTE — TELEPHONE ENCOUNTER
Called patient to remind her of hr Appt on 05/18/2020 and she mentioned that her supplies that were ordered for her dressing changes have not arrived yet.

## 2020-05-18 ENCOUNTER — HOSPITAL ENCOUNTER (OUTPATIENT)
Dept: WOUND CARE | Facility: CLINIC | Age: 55
Discharge: HOME | End: 2020-05-18
Attending: SURGERY | Admitting: SURGERY
Payer: COMMERCIAL

## 2020-05-18 VITALS
SYSTOLIC BLOOD PRESSURE: 155 MMHG | RESPIRATION RATE: 18 BRPM | HEART RATE: 80 BPM | DIASTOLIC BLOOD PRESSURE: 92 MMHG | TEMPERATURE: 97.1 F

## 2020-05-18 DIAGNOSIS — L97.922 ULCER OF LEFT LOWER EXTREMITY WITH FAT LAYER EXPOSED (H): ICD-10-CM

## 2020-05-18 PROCEDURE — 11042 DBRDMT SUBQ TIS 1ST 20SQCM/<: CPT

## 2020-05-18 PROCEDURE — 11042 DBRDMT SUBQ TIS 1ST 20SQCM/<: CPT | Performed by: SURGERY

## 2020-05-18 NOTE — DISCHARGE INSTRUCTIONS
[facility]  [address] [address]    Call us at [phone] if you have any questions about your wounds, have redness or  swelling around your wound, have a fever of 101 or greater or if you have any other  problems or concerns. We answer the phone Monday through Friday 8 am to 4 pm,  please leave a message as we check the voicemail frequently throughout the day.    Dilma Navarro 1965    Wound care recommendations to left lower leg ulcer  Clean wound with soap and water, apply Endoform to wound base then Spandage  followed by gauze.  Change daily    Dr. Giovanni Salazar, May 18, 2020    Wound Healing Clinic follow up with Dr. Salazar on June 1st at 9:00am and please make an appointment for June 15th   No

## 2020-05-18 NOTE — PROGRESS NOTES
[facility] Wound Healing [place] Progress Note    Subject: Dilma Navarro returns for follow-up of her traumatic left calf ulcer.  She has no underlying arterial or venous insufficiency.  Wound was quite significant but continues to improve following wound debridement.  Using endoform collagen presently.    She has been doing very well.  She is quite independent.  Doing her own dressing changes.  No discomfort at the site.    Patient Active Problem List   Diagnosis     Blood clot in vein     Stress fracture of foot     CARDIOVASCULAR SCREENING; LDL GOAL LESS THAN 160     Cushing's syndrome (H)     Traumatic open wound of right lower leg     Hirsutism     History of Cushing disease     History of DVT (deep vein thrombosis)     Osteopenia     Osteoporosis     Ulcer of left lower extremity with fat layer exposed (H)     Past Medical History:   Diagnosis Date     Blood clot in vein 09/18/2012    2 blood clots in L calf     Cushing's syndrome (H) 2005     Exam:  BP (!) 155/92   Pulse 80   Temp 97.1  F (36.2  C) (Temporal)   Resp 18   Wound (used by OP I only) 04/13/20 0825 Left anterior;lower leg trauma (Active)   Length (cm) 4.3 05/18/20 0900   Width (cm) 4.1 05/18/20 0900   Depth (cm) 0.3 05/18/20 0900   Wound (cm^2) 17.63 cm^2 05/18/20 0900   Wound Volume (cm^3) 5.29 cm^3 05/18/20 0900   Wound healing % 62.33 05/18/20 0900   Dressing Appearance moist drainage 05/18/20 0900   Drainage Characteristics/Odor creamy;serosanguineous 05/18/20 0900   Drainage Amount moderate 05/18/20 0900   Thickness/Stage full thickness 05/18/20 1005   Base red/granulating;slough 05/18/20 1005   Periwound intact 05/18/20 1005   Periwound Temperature warm 05/18/20 1005   Care, Wound debrided 05/18/20 1005       Wound (used by OP I only) 05/18/20 0923 Left anterior;distal;lower leg trauma (Active)   Length (cm) 2.5 05/18/20 0900   Width (cm) 2 05/18/20 0900   Depth (cm) 0.3 05/18/20 0900   Wound (cm^2) 5 cm^2 05/18/20 0900    Wound Volume (cm^3) 1.5 cm^3 05/18/20 0900   Dressing Appearance moist drainage 05/18/20 0900   Drainage Characteristics/Odor creamy;serosanguineous 05/18/20 0900   Drainage Amount moderate 05/18/20 0900   Thickness/Stage full thickness 05/18/20 1006   Base red/granulating;slough 05/18/20 1006   Periwound intact 05/18/20 1006   Periwound Temperature warm 05/18/20 1006   Care, Wound debrided 05/18/20 1006     Alert and appropriate.  Very comfortable.  Both ulcerations (central area has bridged over with epithelium the original single large ulcer) very good.  There is a small amount of nonviable tissue on the lower ulcer particular at the 12 o'clock position with some very minimal undermining.  No induration.  No cellulitis.  Excellent perfusion.    Procedure:   Patient was determined to be capable of making their own medical decisions and informed consent was obtained. Topical anesthetic of 4% lidocaine was applied, debridement was performed using a #15 blade down to and including subcutaneous tissue.  All slough and fibrin removed.  More extensive debridement at the sites as mentioned that or deeper to remove this tissue down to healthy firm granulation.  Skin edges debrided circumferentially down to healthy tissue.  Very firm good granulation tissue covers 90% of the base of the wound.  Bleeding controlled with light pressure. Patient tolerated procedure well.    Impression: Ongoing improvement with smaller size.  Wound debrided today.  Will use endoform moistened with saline over the site.  Also has some chlorophyll debriding ointment that she was able to obtain.  She may use this on the wound edges that we discussed above.    Plan: We will dress the wounds with endoform.  Patient will return to the clinic in 2 weeks time    Giovanni Salazar MD on 5/18/2020 at 10:09 AM

## 2020-06-01 ENCOUNTER — HOSPITAL ENCOUNTER (OUTPATIENT)
Dept: WOUND CARE | Facility: CLINIC | Age: 55
Discharge: HOME | End: 2020-06-01
Attending: SURGERY | Admitting: SURGERY
Payer: COMMERCIAL

## 2020-06-01 VITALS
DIASTOLIC BLOOD PRESSURE: 73 MMHG | HEART RATE: 79 BPM | SYSTOLIC BLOOD PRESSURE: 136 MMHG | RESPIRATION RATE: 16 BRPM | TEMPERATURE: 97 F

## 2020-06-01 DIAGNOSIS — L97.922 ULCER OF LEFT LOWER EXTREMITY WITH FAT LAYER EXPOSED (H): ICD-10-CM

## 2020-06-01 PROCEDURE — A6022 COLLAGEN DRSG>16<=48 SQ IN: HCPCS

## 2020-06-01 PROCEDURE — 11042 DBRDMT SUBQ TIS 1ST 20SQCM/<: CPT

## 2020-06-01 PROCEDURE — 11042 DBRDMT SUBQ TIS 1ST 20SQCM/<: CPT | Performed by: SURGERY

## 2020-06-01 NOTE — DISCHARGE INSTRUCTIONS
[facility]  [address] [address]    Call us at [phone] if you have any questions about your wounds, have redness or  swelling around your wound, have a fever of 101 or greater or if you have any other  problems or concerns. We answer the phone Monday through Friday 8 am to 4 pm,  please leave a message as we check the voicemail frequently throughout the day.    Dilma Navarro 1965    Wound care recommendations to left lower leg ulcer  Clean wound with soap and water, apply Fibracol to wound base followed by gauze. Change every other day    Dr. Giovanni Salazar, June 1, 2020    Wound Healing Clinic follow up with Dr. Salazar in 3 weeks

## 2020-06-01 NOTE — PROGRESS NOTES
[facility] Wound Healing [place] Progress Note    Subject: Dilma Navarro returns for follow-up today of her traumatic left tibial ulcer.  She has no underlying PAD or venous issues.  She developed a hematoma following the trauma with subsequent overlying skin loss done with a full-thickness ulcer.    Ulcer has been improving.  Presently using chlorophyll ointment (Santyl type dressing) and the ulcer followed by Fibracol.  She is been changes daily but noticed that the Fibracol is still present and not dissolved.    She has been as active as possible with the COVID-19 restrictions.  No pain.  No swelling.  Good appetite and nutrition.    Patient Active Problem List   Diagnosis     Blood clot in vein     Stress fracture of foot     CARDIOVASCULAR SCREENING; LDL GOAL LESS THAN 160     Cushing's syndrome (H)     Traumatic open wound of right lower leg     Hirsutism     History of Cushing disease     History of DVT (deep vein thrombosis)     Osteopenia     Osteoporosis     Ulcer of left lower extremity with fat layer exposed (H)     Past Medical History:   Diagnosis Date     Blood clot in vein 09/18/2012    2 blood clots in L calf     Cushing's syndrome (H) 2005     Exam:  /73 (BP Location: Left arm)   Pulse 79   Temp 97  F (36.1  C) (Temporal)   Resp 16   Wound (used by OP [facility]I only) 04/13/20 0825 Left anterior;lower leg trauma (Active)   Length (cm) 2.8 06/01/20 0903   Width (cm) 3.4 06/01/20 0903   Depth (cm) 0.1 06/01/20 0903   Wound (cm^2) 9.52 cm^2 06/01/20 0903   Wound Volume (cm^3) 0.95 cm^3 06/01/20 0903   Wound healing % 79.66 06/01/20 0903   Dressing Appearance moist drainage 06/01/20 0903   Drainage Characteristics/Odor serosanguineous 06/01/20 0903   Drainage Amount moderate 06/01/20 0903   Thickness/Stage full thickness 06/01/20 0910   Base red/granulating 06/01/20 0910   Periwound Temperature warm 06/01/20 0910   Care, Wound debrided 06/01/20 0910       Wound (used by OP [facility]I only)  05/18/20 0923 Left anterior;distal;lower leg trauma (Active)   Length (cm) 1.7 06/01/20 0903   Width (cm) 1.1 06/01/20 0903   Depth (cm) 0.1 06/01/20 0903   Wound (cm^2) 1.87 cm^2 06/01/20 0903   Wound Volume (cm^3) 0.19 cm^3 06/01/20 0903   Wound healing % 62.6 06/01/20 0903   Dressing Appearance moist drainage 06/01/20 0903   Drainage Characteristics/Odor serosanguineous 06/01/20 0903   Drainage Amount moderate 06/01/20 0903   Thickness/Stage full thickness 06/01/20 0910   Base red/granulating 06/01/20 0910   Periwound intact 06/01/20 0910   Periwound Temperature warm 06/01/20 0906   Care, Wound debrided 06/01/20 0910     Alert and appropriate.  Very comfortable.  Both ulcers look excellent.  This was originally 1 large ulcer in the skin bridge between the 2 continues to increase in both decrease in size.  Mild amount of slough is noted.  Very firm healthy granulation tissue with no undermining in both ulcers.    Procedure:   Patient was determined to be capable of making their own medical decisions and informed consent was obtained. Topical anesthetic of 4% lidocaine was applied, debridement was performed using a #15 blade down to and including subcutaneous tissue.  All slough and fibrin removed the very firm healthy granulation tissue in the 2 ulcers.  Skin edges slightly debrided also.  Bleeding controlled with light pressure. Patient tolerated procedure well.    Impression: Continued improvement with decreased ulcer size and excellent granulation tissue.  We will continue with the present dressings.  She however will change the dressings every other day but moisten the Fibracol daily to make sure it is activated.      Plan: We will dress the wounds with above.  Patient will return to the clinic in 3 weeks time    Giovanni Salazar MD on 6/1/2020 at 9:14 AM

## 2020-12-14 ENCOUNTER — HEALTH MAINTENANCE LETTER (OUTPATIENT)
Age: 55
End: 2020-12-14

## 2021-04-18 ENCOUNTER — HEALTH MAINTENANCE LETTER (OUTPATIENT)
Age: 56
End: 2021-04-18

## 2021-07-26 ENCOUNTER — HOSPITAL ENCOUNTER (OUTPATIENT)
Dept: WOUND CARE | Facility: CLINIC | Age: 56
Discharge: HOME | End: 2021-07-26
Attending: SURGERY | Admitting: SURGERY
Payer: COMMERCIAL

## 2021-07-26 VITALS
BODY MASS INDEX: 23.51 KG/M2 | TEMPERATURE: 97.1 F | WEIGHT: 141.09 LBS | RESPIRATION RATE: 16 BRPM | HEART RATE: 100 BPM | SYSTOLIC BLOOD PRESSURE: 151 MMHG | HEIGHT: 65 IN | DIASTOLIC BLOOD PRESSURE: 88 MMHG

## 2021-07-26 DIAGNOSIS — S81.801A TRAUMATIC OPEN WOUND OF RIGHT LOWER LEG, INITIAL ENCOUNTER: ICD-10-CM

## 2021-07-26 PROCEDURE — 99213 OFFICE O/P EST LOW 20 MIN: CPT | Mod: 25 | Performed by: SURGERY

## 2021-07-26 PROCEDURE — 11042 DBRDMT SUBQ TIS 1ST 20SQCM/<: CPT

## 2021-07-26 PROCEDURE — 11042 DBRDMT SUBQ TIS 1ST 20SQCM/<: CPT | Performed by: SURGERY

## 2021-07-26 PROCEDURE — G0463 HOSPITAL OUTPT CLINIC VISIT: HCPCS | Mod: 25

## 2021-07-26 ASSESSMENT — MIFFLIN-ST. JEOR: SCORE: 1225.88

## 2021-07-26 NOTE — DISCHARGE INSTRUCTIONS
[facility]  [address] [place] [id number] [address]    Call us at [phone] if you have any questions about your wounds, have redness or swelling around your wound, have a fever of 101 or greater or if you have any other problems or concerns. We answer the phone Monday through Friday 8 am to 4 pm, please leave a message as we check the voicemail frequently throughout the day.     Dilma Navarro      1965    Wound care recommendations to right lateral lower leg  Clean wound with saline and gauze    Cover wound with Aquacel AG cut to fit the size of the wound (moisten with saline if dry wound)  Cover wound with one gauze.  Change dressing daily   Compression:  Apply clean compression spandigrip size E first thing in the morning and remove at night before going to bed.   Remove compression dressing if toes turn blue and/or start to feel numb and is not relieved by elevating the leg for one hour.   Walk as much as you can. When you sit raise your ankle above your hips to promote wound healing.      Giovanni Salazar M.D.. July 26, 2021    Wound Clinic follow up 3 weeks    If you had a positive experience please indicate that on your patient satisfaction survey form that [employer] will be sending you.    It was a pleasure meeting with you today.  Thank you for allowing me and my team the privilege of caring for you today.  YOU are the reason we are here, and I truly hope we provided you with the excellent service you deserve.  Please let us know if there is anything else we can do for you so that we can be sure you are leaving completely satisfied with your care experience.      If you have any billing related questions please call the [facility] Business office at [phone]. The clinic staff does not handle billing related matters.

## 2021-07-26 NOTE — PROGRESS NOTES
[facility] Wound Healing [place] Progress Note    Subject: Dilma Navarro comes to see me today at the wound clinic for evaluation of a new traumatic right tibial ulcer.    Patient has no history of arterial venous insufficiency.  We originally saw her for 2 small traumatic right posterior calf ulcerations from early 2019 that healed over very rapidly.    She developed an ulceration on the left anterior tibial area after falling in the grocery parking lot after hitting a pothole that developed in 2020.  This wound was debrided  Last seen on 2020 where the wound was quite clean but we had not seen her since that time.  It is healed over shortly thereafter and she has had no problems since.    Approximately 5 weeks ago a dog scratch over her right mid tibia.  She went immediately to the emergency department where they attempted to suture the flap.  Unfortunately, this subsequently  leaving an ulcer.  She has been treating this and noticing some mild discomfort.  Has been using a chlorophyll agent that she had used in the past.  She comes for evaluation today.    PMH:   Past Medical History:   Diagnosis Date     Blood clot in vein 2012    2 blood clots in L calf     Cushing's syndrome (H)      Patient Active Problem List   Diagnosis     Blood clot in vein     Stress fracture of foot     CARDIOVASCULAR SCREENING; LDL GOAL LESS THAN 160     Cushing's syndrome (H)     Traumatic open wound of right lower leg     Hirsutism     History of Cushing disease     History of DVT (deep vein thrombosis)     Osteopenia     Osteoporosis     Ulcer of left lower extremity with fat layer exposed (H)     Social Hx:   Social History     Socioeconomic History     Marital status:      Spouse name: Not on file     Number of children: Not on file     Years of education: Not on file     Highest education level: Not on file   Occupational History     Not on file   Tobacco Use     Smoking  status: Never Smoker     Smokeless tobacco: Never Used   Substance and Sexual Activity     Alcohol use: Yes     Alcohol/week: 2.5 standard drinks     Types: 3 Glasses of wine per week     Comment: 2 glasses wine weekly - recently stopped     Drug use: No     Sexual activity: Yes     Partners: Male     Birth control/protection: Condom   Other Topics Concern     Parent/sibling w/ CABG, MI or angioplasty before 65F 55M? No   Social History Narrative     Not on file     Social Determinants of Health     Financial Resource Strain:      Difficulty of Paying Living Expenses:    Food Insecurity:      Worried About Running Out of Food in the Last Year:      Ran Out of Food in the Last Year:    Transportation Needs:      Lack of Transportation (Medical):      Lack of Transportation (Non-Medical):    Physical Activity:      Days of Exercise per Week:      Minutes of Exercise per Session:    Stress:      Feeling of Stress :    Social Connections:      Frequency of Communication with Friends and Family:      Frequency of Social Gatherings with Friends and Family:      Attends Jain Services:      Active Member of Clubs or Organizations:      Attends Club or Organization Meetings:      Marital Status:    Intimate Partner Violence:      Fear of Current or Ex-Partner:      Emotionally Abused:      Physically Abused:      Sexually Abused:        Surgical Hx:   Past Surgical History:   Procedure Laterality Date     APPENDECTOMY       LAPAROSCOPIC CHOLECYSTECTOMY  2/26/2012    Procedure:LAPAROSCOPIC CHOLECYSTECTOMY; Laparoscopic Cholecystectomy; Surgeon:MERCY DE LA VEGA; Location:[facility] OR     Removal of pituitary Cushing's tumor years ago while living in [facility].  No subsequent issues and no need for any endocrine treatment.    Allergies:    Allergies   Allergen Reactions     No Known Drug Allergy        Medications:   Current Outpatient Medications   Medication     Cholecalciferol (VITAMIN D-3) 5000 UNITS TABS     No current  "facility-administered medications for this encounter.       Labs: No results for input(s): ALBUMIN, HGB, INR, WBC, A1C, CRP in the last 11196 hours.    Invalid input(s): PREALBUMIN,  GLUCOSE, MICROBIO  Creatinine   Date Value Ref Range Status   09/18/2012 0.64 0.52 - 1.04 mg/dL Final     GFR Estimate   Date Value Ref Range Status   09/18/2012 >90 >60 mL/min/1.7m2 Final     GFR Estimate If Black   Date Value Ref Range Status   09/18/2012 >90 >60 mL/min/1.7m2 Final     WBC   Date Value Ref Range Status   09/18/2012 12.5 (H) 4.0 - 11.0 10e9/L Final     Lab Results   Component Value Date    CR 0.64 09/18/2012        Nutrition requirements were discussed with patient today.  Objective:  BP (!) 151/88   Pulse 100   Temp 97.1  F (36.2  C) (Temporal)   Resp 16   Ht 1.651 m (5' 5\")   Wt 64 kg (141 lb 1.5 oz)   BMI 23.48 kg/m    Wound (used by OP [facility]I only) 07/26/21 1018 Right (Active)   Dressing Appearance moist drainage 07/26/21 1000   Length (cm) 2.2 07/26/21 1000   Width (cm) 2.4 07/26/21 1000   Depth (cm) 0.3 07/26/21 1000   Wound (cm^2) 5.28 cm^2 07/26/21 1000   Wound Volume (cm^3) 1.58 cm^3 07/26/21 1000   Drainage Characteristics/Odor serosanguineous 07/26/21 1000   Drainage Amount moderate 07/26/21 1000        General:  Patient is alert and orientated, no acute distress.   Very comfortable.  Quite appropriate.  Chest= clear  Cardiovascular= regular rate  Extremities= irregular ulceration of the right mid tibia with thicker layer of fibrinous slough.  Small satellite lesion at 11:00 again filled with fibrin slough.  No undermining.  Mild reactive border erythema no evidence of infection.  No edema.    Well healed and matured epithelium on the left leg where she had a previous ulceration.  No swelling.  Normal sensation.    Vascular: +3 palpable right and left dorsalis pedis/posterior tibial pulses.    Procedure:   Patient was determined to be capable of making their own medical decisions and informed consent was " obtained, topical anesthetic of 4% lidocaine was applied--she still had discomfort and nausea we injected in a field block fashion after prepping with Betadine 10 mL of 0.5% plain Marcaine with excellent results.  Debridement was performed on the 2 ulcers using a #15 blade scalpel removing all slough and fibrin down to healthy underlying tissue and debriding the skin edges circumferentially 0.1 cm down to healthy bleeding tissue with no undermining.  Was used to debride ulcer down to and including subcutaneous tissue. Bleeding controlled with light pressure. Patient tolerated procedure well.    Impression: Traumatic ulceration right tibia.  No evidence of underlying arterial or venous insufficiency.  Wound is been debrided down to healthy base.  We will initially use a moistened Aquacel Ag followed by a Spandagrip which will change on a daily basis.    Aware of the importance of keeping the area clean and nutritional supplements.    Plan:  We will dress the wounds with primary dressing Aquacel Ag, secondary dressing one gauze and one roll gauze secure with tape and change dressing daily with Spandagrip compression to help hold the dressing in place.    Patient will return to the clinic in 2 weeks time.     Giovanni Salazar on 7/26/2021 at 10:31 AM

## 2021-07-26 NOTE — PROGRESS NOTES
Patient arrived for wound care visit. Registered Nurse time spent evaluating patient record, completed a full evaluation and documented wound(s) & lexi-wound skin; provided recommendation based on treatment plan. Applied dressing, reviewed discharge instructions, patient education, and discussed plan of care with appropriate medical team staff members and patient and/or family members.

## 2021-08-05 DIAGNOSIS — L97.229 ULCER OF CALF, LEFT, WITH UNSPECIFIED SEVERITY (H): Primary | ICD-10-CM

## 2021-08-05 RX ORDER — HYDROCODONE BITARTRATE AND ACETAMINOPHEN 5; 325 MG/1; MG/1
1 TABLET ORAL EVERY 6 HOURS PRN
Qty: 15 TABLET | Refills: 0 | Status: SHIPPED | OUTPATIENT
Start: 2021-08-05 | End: 2021-08-09

## 2021-08-05 NOTE — TELEPHONE ENCOUNTER
Dr. Salazar called and ok given to order 15 Norco tablets. Pt called to notify and discuss what pharmacy to send prescription to. Norco prescription routed to Dr. Salazar.

## 2021-08-09 ENCOUNTER — TELEPHONE (OUTPATIENT)
Dept: OTHER | Facility: CLINIC | Age: 56
End: 2021-08-09

## 2021-08-09 ENCOUNTER — HOSPITAL ENCOUNTER (OUTPATIENT)
Dept: WOUND CARE | Facility: CLINIC | Age: 56
Discharge: HOME | End: 2021-08-09
Attending: SURGERY | Admitting: SURGERY
Payer: COMMERCIAL

## 2021-08-09 VITALS
SYSTOLIC BLOOD PRESSURE: 149 MMHG | HEART RATE: 94 BPM | RESPIRATION RATE: 16 BRPM | TEMPERATURE: 97.5 F | DIASTOLIC BLOOD PRESSURE: 99 MMHG

## 2021-08-09 DIAGNOSIS — L97.912 ULCER OF RIGHT LOWER EXTREMITY WITH FAT LAYER EXPOSED (H): ICD-10-CM

## 2021-08-09 DIAGNOSIS — L97.229 ULCER OF CALF, LEFT, WITH UNSPECIFIED SEVERITY (H): Primary | ICD-10-CM

## 2021-08-09 PROCEDURE — 11042 DBRDMT SUBQ TIS 1ST 20SQCM/<: CPT

## 2021-08-09 PROCEDURE — 11042 DBRDMT SUBQ TIS 1ST 20SQCM/<: CPT | Performed by: SURGERY

## 2021-08-09 PROCEDURE — 99213 OFFICE O/P EST LOW 20 MIN: CPT | Mod: 25 | Performed by: SURGERY

## 2021-08-09 RX ORDER — HYDROCODONE BITARTRATE AND ACETAMINOPHEN 5; 325 MG/1; MG/1
1 TABLET ORAL EVERY 6 HOURS PRN
Qty: 12 TABLET | Refills: 0 | Status: ON HOLD | OUTPATIENT
Start: 2021-08-09 | End: 2024-09-09

## 2021-08-09 RX ORDER — HYDROCODONE BITARTRATE AND ACETAMINOPHEN 5; 325 MG/1; MG/1
1 TABLET ORAL EVERY 6 HOURS PRN
Qty: 12 TABLET | Refills: 0 | Status: CANCELLED | OUTPATIENT
Start: 2021-08-09

## 2021-08-09 NOTE — TELEPHONE ENCOUNTER
[facility]    Who is the name of the provider?:  Martin      What is the location you see this provider at?: [place]    Reason for call:  Please call to reschedule 9/13 appt, wants latest appt available that same day.    Can we leave a detailed message on this number?  YES

## 2021-08-09 NOTE — PROGRESS NOTES
[facility] Wound Healing [place] Progress Note    Subject: Dilma Navarro returns for 2-week follow-up.  She developed a traumatic ulceration to the right mid anterior calf.  He saw her on 7/26/2021 where the ulcer was debrided.  She had excellent blood supply.  Treated with Aquacel Ag.  She did notice discomfort and did not require oral narcotic for pain control which he still taking.  She has 5 pills left of that prescription.  Otherwise doing very well.  Appetite and nutritional intake excellent.    Patient Active Problem List   Diagnosis     Blood clot in vein     Stress fracture of foot     CARDIOVASCULAR SCREENING; LDL GOAL LESS THAN 160     Cushing's syndrome (H)     Traumatic open wound of right lower leg     Hirsutism     History of Cushing disease     History of DVT (deep vein thrombosis)     Osteopenia     Osteoporosis     Ulcer of left lower extremity with fat layer exposed (H)     Past Medical History:   Diagnosis Date     Blood clot in vein 09/18/2012    2 blood clots in L calf     Cushing's syndrome (H) 2005     Exam:  BP (!) 149/99   Pulse 94   Temp 97.5  F (36.4  C) (Temporal)   Resp 16   Wound (used by OP [facility] only) 07/26/21 1018 Right (Active)   Thickness/Stage full thickness 08/09/21 0758   Dressing Appearance moist drainage 08/09/21 0758   Periwound intact;redness;macerated 08/09/21 0758   Periwound Temperature warm 08/09/21 0758   Periwound Skin Turgor soft 08/09/21 0758   Length (cm) 2.3 08/09/21 0758   Width (cm) 3 08/09/21 0758   Depth (cm) 0.5 08/09/21 0758   Wound (cm^2) 6.9 cm^2 08/09/21 0758   Wound Volume (cm^3) 3.45 cm^3 08/09/21 0758   Wound healing % -30.68 08/09/21 0758   Drainage Characteristics/Odor serosanguineous;creamy 08/09/21 0758   Drainage Amount moderate 08/09/21 0758     Alert and appropriate.  Very comfortable.  Calf ulceration has a satellite lesion at 11:00 that we previously noted.  However with probing this does undermine approximately 0.5 cm at the 11  o'clock position.  Approximately 30% of the wound has healthy granulation tissue.  No undermining otherwise.  No signs of infection.    Procedure:   Patient was determined to be capable of making their own medical decisions and informed consent was obtained. Topical anesthetic of 4% lidocaine was applied, this was not adequate and we prepped the area with Betadine and injected 10 mL of 0.5% plain Marcaine for field block with excellent results.  Debridement was performed using a #15 blade down to and including subcutaneous tissue.  I kept the bridge between the satellite lesion and also the been at 11:00 off the satellite lesion to correct the undermining.  Skin edges were then debrided circumferentially down to healthy tissue.  Debridement was performed at the center of the main ulcer to the point that we had healthy granulation tissue with excellent blood supply.  Vashe was applied to the wound followed by Aquacel Ag.  Bleeding controlled with light pressure. Patient tolerated procedure well.    Impression: Overall doing well.  We connected the satellite lesion and unroofed this further so now we have 1 ulcer that will be treated with Aquacel Ag.  I will give her an additional #12 pain pills that she will use sparingly.    Plan: We will dress the wounds with primary dressing Aquacel Ag, secondary dressing one gauze and one roll gauze secure with tape and change dressing daily.  Patient will return to the clinic in 2 weeks time    Giovanni Salazar on 8/9/2021 at 8:10 AM

## 2021-08-09 NOTE — DISCHARGE INSTRUCTIONS
[facility]  [address] [address]    Call us at [phone] if you have any questions about your wounds, have redness or swelling around your wound, have a fever of 101 or greater or if you have any other problems or concerns. We answer the phone Monday through Friday 8 am to 4 pm, please leave a message as we check the voicemail frequently throughout the day.     Dilma Navarro      1965    Wound care recommendations to right lateral lower leg  Clean wound with saline and gauze  Cover wound with Aquacel AG cut to fit the size of the wound (moisten with saline if dry wound)  Cover wound with one gauze.  Change dressing daily    Compression: Apply clean compression spandigrip size E first thing in the morning  and remove at night before going to bed.  Remove compression dressing if toes turn blue and/or start to feel numb and is not relieved by elevating the leg for one hour.  Walk as much as you can. When you sit raise your ankle above your hips to promote wound healing.       Giovanni Salazar M.D. August 9, 2021      If you had a positive experience please indicate on your patient satisfaction survey form that [facility] will be sending you.    If you have any billing related questions please call the [facility] Business office at [phone]. The clinic staff does not handle billing related matters.

## 2021-08-23 ENCOUNTER — HOSPITAL ENCOUNTER (OUTPATIENT)
Dept: WOUND CARE | Facility: CLINIC | Age: 56
Discharge: HOME | End: 2021-08-23
Attending: SURGERY | Admitting: SURGERY
Payer: COMMERCIAL

## 2021-08-23 VITALS
DIASTOLIC BLOOD PRESSURE: 101 MMHG | SYSTOLIC BLOOD PRESSURE: 159 MMHG | HEART RATE: 81 BPM | TEMPERATURE: 97.2 F | RESPIRATION RATE: 16 BRPM

## 2021-08-23 DIAGNOSIS — S81.801A TRAUMATIC OPEN WOUND OF RIGHT LOWER LEG, INITIAL ENCOUNTER: ICD-10-CM

## 2021-08-23 DIAGNOSIS — L97.912 ULCER OF RIGHT LOWER EXTREMITY WITH FAT LAYER EXPOSED (H): Primary | ICD-10-CM

## 2021-08-23 PROCEDURE — 11042 DBRDMT SUBQ TIS 1ST 20SQCM/<: CPT

## 2021-08-23 PROCEDURE — 11042 DBRDMT SUBQ TIS 1ST 20SQCM/<: CPT | Performed by: SURGERY

## 2021-08-23 NOTE — PROGRESS NOTES
[facility] [place] Progress Note    Subject: Dilma Navarro returns for follow-up of her traumatic right medial calf ulcer.  She has excellent blood supply.  No barriers to healing with good nutrition, etc.    She is here 2 weeks ago where the wound was debrided and we continued with Aquacel Ag daily.  She did notice some bleeding following the debridement from the center of the wound that resolved.  Occasional discomfort and we had given her 12 Vicodin's at her last visit to use as needed and she still has multiple days left.    Patient Active Problem List   Diagnosis     Blood clot in vein     Stress fracture of foot     CARDIOVASCULAR SCREENING; LDL GOAL LESS THAN 160     Cushing's syndrome (H)     Traumatic open wound of right lower leg     Hirsutism     History of Cushing disease     History of DVT (deep vein thrombosis)     Osteopenia     Osteoporosis     Ulcer of right lower extremity with fat layer exposed (H)     Past Medical History:   Diagnosis Date     Blood clot in vein 09/18/2012    2 blood clots in L calf     Cushing's syndrome (H) 2005     Exam:  BP (!) 159/101   Pulse 81   Temp 97.2  F (36.2  C) (Temporal)   Resp 16   Wound (used by OP [facility] only) 07/26/21 1018 Right (Active)   Thickness/Stage full thickness 08/23/21 0759   Dressing Appearance moist drainage 08/23/21 0759   Periwound intact;redness;macerated 08/23/21 0759   Periwound Temperature warm 08/23/21 0759   Periwound Skin Turgor soft 08/23/21 0759   Length (cm) 2.4 08/23/21 0759   Width (cm) 1.9 08/23/21 0759   Depth (cm) 0.4 08/23/21 0759   Wound (cm^2) 4.56 cm^2 08/23/21 0759   Wound Volume (cm^3) 1.82 cm^3 08/23/21 0759   Wound healing % 13.64 08/23/21 0759   Drainage Characteristics/Odor serosanguineous 08/23/21 0759   Drainage Amount moderate 08/23/21 0759     Alert and appropriate.  Very comfortable.  We are seeing more fibrin in the base of the wound as noted in the photograph.  Also be irritable and has a  hematoma which is the dark segment on the photograph.  Very slight undermining from the 7 to 10 o'clock position.    Procedure:   Patient was determined to be capable of making their own medical decisions and informed consent was obtained. Topical anesthetic of 4% lidocaine was applied, debridement was performed using a #15 blade down to and including subcutaneous tissue.  Removed all fibrin slough.  Remove the hematoma sharply.  Debrided the skin edges circumferentially but somewhat more on the undermined area to eliminate this.  We do see healthy granulation tissue and at least 80% of the wound but not at the hematoma site which is somewhat expected.  Bleeding controlled with light pressure. Patient tolerated procedure well.    Impression: Overall improvement.  I am concerned about the increased fibrin over a 2-week span and thus recommend weekly debridements.  We will switch to endoform antimicrobial for the ulcer that she will change on a daily basis.  He knows he needs to keep this moist.    Plan: We will dress the wounds with primary dressing endoform antimicrobial, secondary dressing one gauze and one roll gauze secure with tape and change dressing daily.  Patient will return to the clinic in 1 weeks time    Giovanni Salazar on 8/23/2021 at 8:13 AM

## 2021-08-23 NOTE — DISCHARGE INSTRUCTIONS
[facility]  [address] [address]    Call us at [phone] if you have any questions about your wounds, have redness or swelling around your wound, have a fever of 101 or greater or if you have any other problems or concerns. We answer the phone Monday through Friday 8 am to 4 pm, please leave a message as we check the voicemail frequently throughout the day.     Dilma Navarro      1965    Wound care recommendations to right lateral lower leg  Clean wound with saline and gauze  Cover wound with Endoform Antimicrobial cut to fit the size of the wound (moisten with saline if dry wound)  Cover wound with one gauze.  Change dressing daily.    Walk as much as you can. When you sit raise your ankle above your hips to  promote wound healing.       Giovanni Salazar M.D.. August 23, 2021      If you had a positive experience please indicate on your patient satisfaction survey form that [facility] will be sending you.    If you have any billing related questions please call the [facility] Business office at [phone]. The clinic staff does not handle billing related matters.

## 2021-08-23 NOTE — ADDENDUM NOTE
Encounter addended by: Vilma Jj RN on: 8/23/2021 8:31 AM   Actions taken: Charge Capture section accepted, Flowsheet accepted, Order list changed, Diagnosis association updated

## 2021-08-30 ENCOUNTER — HOSPITAL ENCOUNTER (OUTPATIENT)
Dept: WOUND CARE | Facility: CLINIC | Age: 56
Discharge: HOME | End: 2021-08-30
Attending: SURGERY | Admitting: SURGERY
Payer: COMMERCIAL

## 2021-08-30 VITALS — HEART RATE: 99 BPM | SYSTOLIC BLOOD PRESSURE: 149 MMHG | DIASTOLIC BLOOD PRESSURE: 94 MMHG | RESPIRATION RATE: 16 BRPM

## 2021-08-30 DIAGNOSIS — S81.801A TRAUMATIC OPEN WOUND OF RIGHT LOWER LEG, INITIAL ENCOUNTER: ICD-10-CM

## 2021-08-30 PROCEDURE — 11042 DBRDMT SUBQ TIS 1ST 20SQCM/<: CPT | Performed by: SURGERY

## 2021-08-30 PROCEDURE — 11042 DBRDMT SUBQ TIS 1ST 20SQCM/<: CPT

## 2021-08-30 NOTE — DISCHARGE INSTRUCTIONS
[facility]  [address] [address]    Call us at [phone] if you have any questions about your wounds, have redness or swelling around your wound, have a fever of 101 or greater or if you have any other problems or concerns. We answer the phone Monday through Friday 8 am to 4 pm, please leave a message as we check the voicemail frequently throughout the day.     Dilma Navarro      1965    Wound care recommendations to right lateral lower leg  Clean wound with soap and water  Cover wound with Chlorophyl ointment (pt supply)   Cover wound with one gauze and roll gauze  Change dressing daily or every other day    Walk as much as you can. When you sit raise your ankle above your hips to promote wound healing.     Giovanni Salazar M.D. August 30, 2021    Wound Clinic follow up in 2 weeks    If you had a positive experience please indicate that on your patient satisfaction survey form that [facility] will be sending you.    It was a pleasure meeting with you today.  Thank you for allowing me and my team the privilege of caring for you today.  YOU are the reason we are here, and I truly hope we provided you with the excellent service you deserve.  Please let us know if there is anything else we can do for you so that we can be sure you are leaving completely satisfied with your care experience.      If you have any billing related questions please call the [facility] Business office at [phone]. The clinic staff does not handle billing related matters.

## 2021-08-30 NOTE — PROGRESS NOTES
[facility] [place] Progress Note    Subject: Dilma Navarro returns for a 1 week follow-up of her traumatic right tibial ulcer.  No evidence of underlying PAD or venous insufficiency.  Wound was debrided last week and continue on endoform antimicrobial dressings.  These of been working well and patient has no complaints.    At the last visit she had a large amount of fibrin/slough that required debridement.  Deeper at the 11:00 to 12 o'clock position following debridement down to healthier tissue.  Reports that the endoform antimicrobial is sticking even when she changes it daily.    She has been using the electrical stimulus on a daily basis and this has been helpful.  In the past she used a chlorophyll ointment that she obtained when she was in [place] (not available here in the [place]) and this was very helpful.  Wants to know she can switch over to this.    Patient Active Problem List   Diagnosis     Blood clot in vein     Stress fracture of foot     CARDIOVASCULAR SCREENING; LDL GOAL LESS THAN 160     Cushing's syndrome (H)     Traumatic open wound of right lower leg     Hirsutism     History of Cushing disease     History of DVT (deep vein thrombosis)     Osteopenia     Osteoporosis     Ulcer of right lower extremity with fat layer exposed (H)     Past Medical History:   Diagnosis Date     Blood clot in vein 09/18/2012    2 blood clots in L calf     Cushing's syndrome (H) 2005     Exam:  BP (!) 149/94   Pulse 99   Resp 16   Wound (used by OP [facility] only) 07/26/21 1018 Right (Active)   Thickness/Stage full thickness 08/30/21 0947   Dressing Appearance moist drainage 08/30/21 0947   Base granulating 08/30/21 0947   Periwound intact 08/30/21 0947   Periwound Temperature warm 08/30/21 0947   Periwound Skin Turgor soft 08/30/21 0947   Edges open 08/30/21 0947   Length (cm) 1.9 08/30/21 0947   Width (cm) 2 08/30/21 0947   Depth (cm) 0.3 08/30/21 0947   Wound (cm^2) 3.8 cm^2 08/30/21 0947    Wound Volume (cm^3) 1.14 cm^3 08/30/21 0947   Wound healing % 28.03 08/30/21 0947   Drainage Characteristics/Odor serosanguineous 08/30/21 0947   Drainage Amount moderate 08/30/21 0947     Alert and appropriate.  Very comfortable.  No edema or erythema around ulcer.  +3 palpable right DP pulse.  Ulceration is better.  Still some bioburden/slough but no nonviable tissue.  Edges well-defined with no undermining.  Good granulation and most of the wound patent.    Procedure:   Patient was determined to be capable of making their own medical decisions and informed consent was obtained. Topical anesthetic of 4% lidocaine was applied, debridement was performed using a #15 blade down to and including subcutaneous tissue.  All bioburden/slough excised.  Skin edges slightly debrided 0.1 cm.  Good firm bleeding tissue throughout.  Bleeding controlled with light pressure. Patient tolerated procedure well.    Impression: Ongoing improvement.  Wound is decreased in size and is less bioburden today.  Healthy granulation tissue at the bed.  Applying Woun'Dres collagen gel today but patient will switch over to her chlorophyll ointment tomorrow that she has at home and changes on a daily basis.    Plan: We will dress the wounds with primary dressing Klarfeld, secondary dressing one gauze and one roll gauze secure with tape and change dressing daily.  Patient will return to the clinic in 2 weeks time    Giovanni Salazar on 8/30/2021 at 9:48 AM

## 2021-10-02 ENCOUNTER — HEALTH MAINTENANCE LETTER (OUTPATIENT)
Age: 56
End: 2021-10-02

## 2022-03-19 ENCOUNTER — HEALTH MAINTENANCE LETTER (OUTPATIENT)
Age: 57
End: 2022-03-19

## 2022-05-14 ENCOUNTER — HEALTH MAINTENANCE LETTER (OUTPATIENT)
Age: 57
End: 2022-05-14

## 2022-09-03 ENCOUNTER — HEALTH MAINTENANCE LETTER (OUTPATIENT)
Age: 57
End: 2022-09-03

## 2023-06-03 ENCOUNTER — HEALTH MAINTENANCE LETTER (OUTPATIENT)
Age: 58
End: 2023-06-03

## 2024-04-27 ENCOUNTER — HEALTH MAINTENANCE LETTER (OUTPATIENT)
Age: 59
End: 2024-04-27

## 2024-07-06 ENCOUNTER — HEALTH MAINTENANCE LETTER (OUTPATIENT)
Age: 59
End: 2024-07-06

## 2024-09-09 ENCOUNTER — HOSPITAL ENCOUNTER (INPATIENT)
Facility: CLINIC | Age: 59
LOS: 4 days | Discharge: HOME | End: 2024-09-13
Attending: EMERGENCY MEDICINE | Admitting: INTERNAL MEDICINE
Payer: COMMERCIAL

## 2024-09-09 ENCOUNTER — TRANSFERRED RECORDS (OUTPATIENT)
Dept: HEALTH INFORMATION MANAGEMENT | Facility: CLINIC | Age: 59
End: 2024-09-09
Payer: COMMERCIAL

## 2024-09-09 DIAGNOSIS — R10.32 LLQ ABDOMINAL PAIN: ICD-10-CM

## 2024-09-09 DIAGNOSIS — K57.20 DIVERTICULITIS OF COLON WITH PERFORATION: ICD-10-CM

## 2024-09-09 LAB — LACTATE SERPL-SCNC: 1.3 MMOL/L (ref 0.7–2)

## 2024-09-09 PROCEDURE — 250N000011 HC RX IP 250 OP 636: Performed by: EMERGENCY MEDICINE

## 2024-09-09 PROCEDURE — 258N000003 HC RX IP 258 OP 636: Performed by: INTERNAL MEDICINE

## 2024-09-09 PROCEDURE — 96360 HYDRATION IV INFUSION INIT: CPT

## 2024-09-09 PROCEDURE — 99285 EMERGENCY DEPT VISIT HI MDM: CPT | Mod: 25

## 2024-09-09 PROCEDURE — 120N000001 HC R&B MED SURG/OB

## 2024-09-09 PROCEDURE — 250N000013 HC RX MED GY IP 250 OP 250 PS 637: Performed by: INTERNAL MEDICINE

## 2024-09-09 PROCEDURE — 83605 ASSAY OF LACTIC ACID: CPT | Performed by: EMERGENCY MEDICINE

## 2024-09-09 PROCEDURE — 99222 1ST HOSP IP/OBS MODERATE 55: CPT | Performed by: INTERNAL MEDICINE

## 2024-09-09 PROCEDURE — 36415 COLL VENOUS BLD VENIPUNCTURE: CPT | Performed by: EMERGENCY MEDICINE

## 2024-09-09 PROCEDURE — 258N000003 HC RX IP 258 OP 636: Performed by: EMERGENCY MEDICINE

## 2024-09-09 RX ORDER — ZINC SULFATE 50(220)MG
220 CAPSULE ORAL DAILY
COMMUNITY

## 2024-09-09 RX ORDER — HYDRALAZINE HYDROCHLORIDE 10 MG/1
10 TABLET, FILM COATED ORAL EVERY 4 HOURS PRN
Status: DISCONTINUED | OUTPATIENT
Start: 2024-09-09 | End: 2024-09-13 | Stop reason: HOSPADM

## 2024-09-09 RX ORDER — PROCHLORPERAZINE 25 MG
25 SUPPOSITORY, RECTAL RECTAL EVERY 12 HOURS PRN
Status: DISCONTINUED | OUTPATIENT
Start: 2024-09-09 | End: 2024-09-13 | Stop reason: HOSPADM

## 2024-09-09 RX ORDER — SODIUM CHLORIDE, SODIUM LACTATE, POTASSIUM CHLORIDE, CALCIUM CHLORIDE 600; 310; 30; 20 MG/100ML; MG/100ML; MG/100ML; MG/100ML
INJECTION, SOLUTION INTRAVENOUS CONTINUOUS
Status: DISCONTINUED | OUTPATIENT
Start: 2024-09-09 | End: 2024-09-12

## 2024-09-09 RX ORDER — HYDROMORPHONE HCL IN WATER/PF 6 MG/30 ML
0.4 PATIENT CONTROLLED ANALGESIA SYRINGE INTRAVENOUS
Status: DISCONTINUED | OUTPATIENT
Start: 2024-09-09 | End: 2024-09-13 | Stop reason: HOSPADM

## 2024-09-09 RX ORDER — PIPERACILLIN SODIUM, TAZOBACTAM SODIUM 4; .5 G/20ML; G/20ML
4.5 INJECTION, POWDER, LYOPHILIZED, FOR SOLUTION INTRAVENOUS ONCE
Status: COMPLETED | OUTPATIENT
Start: 2024-09-09 | End: 2024-09-09

## 2024-09-09 RX ORDER — ENOXAPARIN SODIUM 100 MG/ML
40 INJECTION SUBCUTANEOUS EVERY 24 HOURS
Status: DISCONTINUED | OUTPATIENT
Start: 2024-09-09 | End: 2024-09-09

## 2024-09-09 RX ORDER — FOLIC ACID 0.8 MG
2 TABLET ORAL DAILY
COMMUNITY

## 2024-09-09 RX ORDER — ONDANSETRON 4 MG/1
4 TABLET, ORALLY DISINTEGRATING ORAL EVERY 6 HOURS PRN
Status: DISCONTINUED | OUTPATIENT
Start: 2024-09-09 | End: 2024-09-13 | Stop reason: HOSPADM

## 2024-09-09 RX ORDER — HYDRALAZINE HYDROCHLORIDE 20 MG/ML
10 INJECTION INTRAMUSCULAR; INTRAVENOUS EVERY 4 HOURS PRN
Status: DISCONTINUED | OUTPATIENT
Start: 2024-09-09 | End: 2024-09-13 | Stop reason: HOSPADM

## 2024-09-09 RX ORDER — OXYCODONE HYDROCHLORIDE 5 MG/1
5 TABLET ORAL EVERY 4 HOURS PRN
Status: DISCONTINUED | OUTPATIENT
Start: 2024-09-09 | End: 2024-09-13 | Stop reason: HOSPADM

## 2024-09-09 RX ORDER — PIPERACILLIN SODIUM, TAZOBACTAM SODIUM 3; .375 G/15ML; G/15ML
3.38 INJECTION, POWDER, LYOPHILIZED, FOR SOLUTION INTRAVENOUS EVERY 6 HOURS
Status: DISCONTINUED | OUTPATIENT
Start: 2024-09-10 | End: 2024-09-13 | Stop reason: HOSPADM

## 2024-09-09 RX ORDER — NALOXONE HYDROCHLORIDE 0.4 MG/ML
0.4 INJECTION, SOLUTION INTRAMUSCULAR; INTRAVENOUS; SUBCUTANEOUS
Status: DISCONTINUED | OUTPATIENT
Start: 2024-09-09 | End: 2024-09-13 | Stop reason: HOSPADM

## 2024-09-09 RX ORDER — HYDROMORPHONE HCL IN WATER/PF 6 MG/30 ML
0.2 PATIENT CONTROLLED ANALGESIA SYRINGE INTRAVENOUS
Status: DISCONTINUED | OUTPATIENT
Start: 2024-09-09 | End: 2024-09-13 | Stop reason: HOSPADM

## 2024-09-09 RX ORDER — ONDANSETRON 2 MG/ML
4 INJECTION INTRAMUSCULAR; INTRAVENOUS EVERY 6 HOURS PRN
Status: DISCONTINUED | OUTPATIENT
Start: 2024-09-09 | End: 2024-09-13 | Stop reason: HOSPADM

## 2024-09-09 RX ORDER — VITAMIN A 3000 MCG
25000 CAPSULE ORAL DAILY
COMMUNITY

## 2024-09-09 RX ORDER — PROCHLORPERAZINE MALEATE 10 MG
10 TABLET ORAL EVERY 6 HOURS PRN
Status: DISCONTINUED | OUTPATIENT
Start: 2024-09-09 | End: 2024-09-13 | Stop reason: HOSPADM

## 2024-09-09 RX ORDER — PIPERACILLIN SODIUM, TAZOBACTAM SODIUM 3; .375 G/15ML; G/15ML
3.38 INJECTION, POWDER, LYOPHILIZED, FOR SOLUTION INTRAVENOUS EVERY 6 HOURS
Status: DISCONTINUED | OUTPATIENT
Start: 2024-09-09 | End: 2024-09-09

## 2024-09-09 RX ORDER — NALOXONE HYDROCHLORIDE 0.4 MG/ML
0.2 INJECTION, SOLUTION INTRAMUSCULAR; INTRAVENOUS; SUBCUTANEOUS
Status: DISCONTINUED | OUTPATIENT
Start: 2024-09-09 | End: 2024-09-13 | Stop reason: HOSPADM

## 2024-09-09 RX ORDER — ACETAMINOPHEN 650 MG/1
650 SUPPOSITORY RECTAL EVERY 4 HOURS PRN
Status: DISCONTINUED | OUTPATIENT
Start: 2024-09-09 | End: 2024-09-13 | Stop reason: HOSPADM

## 2024-09-09 RX ORDER — CHLORAL HYDRATE 500 MG
2 CAPSULE ORAL DAILY
COMMUNITY

## 2024-09-09 RX ORDER — ACETAMINOPHEN 325 MG/1
650 TABLET ORAL EVERY 4 HOURS PRN
Status: DISCONTINUED | OUTPATIENT
Start: 2024-09-09 | End: 2024-09-13 | Stop reason: HOSPADM

## 2024-09-09 RX ORDER — HYDROCORTISONE 10 MG/1
TABLET ORAL
COMMUNITY

## 2024-09-09 RX ORDER — LIDOCAINE 40 MG/G
CREAM TOPICAL
Status: DISCONTINUED | OUTPATIENT
Start: 2024-09-09 | End: 2024-09-13 | Stop reason: HOSPADM

## 2024-09-09 RX ADMIN — ACETAMINOPHEN 650 MG: 325 TABLET ORAL at 23:22

## 2024-09-09 RX ADMIN — SODIUM CHLORIDE 1000 ML: 9 INJECTION, SOLUTION INTRAVENOUS at 20:00

## 2024-09-09 RX ADMIN — SODIUM CHLORIDE, POTASSIUM CHLORIDE, SODIUM LACTATE AND CALCIUM CHLORIDE: 600; 310; 30; 20 INJECTION, SOLUTION INTRAVENOUS at 23:47

## 2024-09-09 RX ADMIN — PIPERACILLIN AND TAZOBACTAM 4.5 G: 4; .5 INJECTION, POWDER, FOR SOLUTION INTRAVENOUS at 22:48

## 2024-09-09 ASSESSMENT — ACTIVITIES OF DAILY LIVING (ADL)
ADLS_ACUITY_SCORE: 35
ADLS_ACUITY_SCORE: 38
ADLS_ACUITY_SCORE: 35
ADLS_ACUITY_SCORE: 35

## 2024-09-10 LAB
ANION GAP SERPL CALCULATED.3IONS-SCNC: 12 MMOL/L (ref 7–15)
BUN SERPL-MCNC: 7.9 MG/DL (ref 8–23)
CALCIUM SERPL-MCNC: 9.2 MG/DL (ref 8.8–10.4)
CHLORIDE SERPL-SCNC: 111 MMOL/L (ref 98–107)
CREAT SERPL-MCNC: 0.76 MG/DL (ref 0.51–0.95)
EGFRCR SERPLBLD CKD-EPI 2021: 89 ML/MIN/1.73M2
ERYTHROCYTE [DISTWIDTH] IN BLOOD BY AUTOMATED COUNT: 13.3 % (ref 10–15)
GLUCOSE SERPL-MCNC: 77 MG/DL (ref 70–99)
HCO3 SERPL-SCNC: 26 MMOL/L (ref 22–29)
HCT VFR BLD AUTO: 36.2 % (ref 35–47)
HGB BLD-MCNC: 11.6 G/DL (ref 11.7–15.7)
MCH RBC QN AUTO: 30.4 PG (ref 26.5–33)
MCHC RBC AUTO-ENTMCNC: 32 G/DL (ref 31.5–36.5)
MCV RBC AUTO: 95 FL (ref 78–100)
PLATELET # BLD AUTO: 403 10E3/UL (ref 150–450)
POTASSIUM SERPL-SCNC: 3.8 MMOL/L (ref 3.4–5.3)
RBC # BLD AUTO: 3.81 10E6/UL (ref 3.8–5.2)
SODIUM SERPL-SCNC: 149 MMOL/L (ref 135–145)
WBC # BLD AUTO: 9.7 10E3/UL (ref 4–11)

## 2024-09-10 PROCEDURE — 99207 PR APP CREDIT; MD BILLING SHARED VISIT: CPT | Mod: FS | Performed by: PHYSICIAN ASSISTANT

## 2024-09-10 PROCEDURE — 250N000013 HC RX MED GY IP 250 OP 250 PS 637: Performed by: INTERNAL MEDICINE

## 2024-09-10 PROCEDURE — 80048 BASIC METABOLIC PNL TOTAL CA: CPT | Performed by: INTERNAL MEDICINE

## 2024-09-10 PROCEDURE — 99232 SBSQ HOSP IP/OBS MODERATE 35: CPT | Performed by: INTERNAL MEDICINE

## 2024-09-10 PROCEDURE — 85027 COMPLETE CBC AUTOMATED: CPT | Performed by: INTERNAL MEDICINE

## 2024-09-10 PROCEDURE — 120N000001 HC R&B MED SURG/OB

## 2024-09-10 PROCEDURE — 258N000003 HC RX IP 258 OP 636: Performed by: INTERNAL MEDICINE

## 2024-09-10 PROCEDURE — 36415 COLL VENOUS BLD VENIPUNCTURE: CPT | Performed by: INTERNAL MEDICINE

## 2024-09-10 PROCEDURE — 250N000011 HC RX IP 250 OP 636: Performed by: INTERNAL MEDICINE

## 2024-09-10 PROCEDURE — 99254 IP/OBS CNSLTJ NEW/EST MOD 60: CPT | Mod: FS | Performed by: COLON & RECTAL SURGERY

## 2024-09-10 RX ADMIN — HYDROCORTISONE SODIUM SUCCINATE 80 MG: 100 INJECTION, POWDER, FOR SOLUTION INTRAMUSCULAR; INTRAVENOUS at 09:13

## 2024-09-10 RX ADMIN — ACETAMINOPHEN 650 MG: 325 TABLET ORAL at 16:29

## 2024-09-10 RX ADMIN — HYDROCORTISONE SODIUM SUCCINATE 40 MG: 100 INJECTION, POWDER, FOR SOLUTION INTRAMUSCULAR; INTRAVENOUS at 15:06

## 2024-09-10 RX ADMIN — ACETAMINOPHEN 650 MG: 325 TABLET ORAL at 20:42

## 2024-09-10 RX ADMIN — ACETAMINOPHEN 650 MG: 325 TABLET ORAL at 12:45

## 2024-09-10 RX ADMIN — ACETAMINOPHEN 650 MG: 325 TABLET ORAL at 03:59

## 2024-09-10 RX ADMIN — SODIUM CHLORIDE, POTASSIUM CHLORIDE, SODIUM LACTATE AND CALCIUM CHLORIDE: 600; 310; 30; 20 INJECTION, SOLUTION INTRAVENOUS at 20:42

## 2024-09-10 RX ADMIN — PIPERACILLIN AND TAZOBACTAM 3.38 G: 3; .375 INJECTION, POWDER, FOR SOLUTION INTRAVENOUS at 18:59

## 2024-09-10 RX ADMIN — ACETAMINOPHEN 650 MG: 325 TABLET ORAL at 08:16

## 2024-09-10 RX ADMIN — PIPERACILLIN AND TAZOBACTAM 3.38 G: 3; .375 INJECTION, POWDER, FOR SOLUTION INTRAVENOUS at 11:16

## 2024-09-10 RX ADMIN — PIPERACILLIN AND TAZOBACTAM 3.38 G: 3; .375 INJECTION, POWDER, FOR SOLUTION INTRAVENOUS at 05:31

## 2024-09-10 ASSESSMENT — ACTIVITIES OF DAILY LIVING (ADL)
ADLS_ACUITY_SCORE: 23

## 2024-09-10 NOTE — ED PROVIDER NOTES
Emergency Department Note      History of Present Illness     Chief Complaint   Abdominal Pain and Diverticulitis per CT from [facility]   Dilma Navarro is a 59 year old female who presents emergency department for abdominal pain.  Patient was evaluated by her primary care doctor for 2 weeks of intermittent left lower quadrant pain.  Patient recently underwent a transsphenoidal resection for Cushing's disease.  Has been on hydrocortisone for adrenal insufficiency.  She recently got over COVID infection.  [facility] has been managing her hydrocortisone treatment.  No vomiting.  No fevers or chills.  She presented to the clinic today who performed a CT scan and referred patient to the emergency department.    Independent Historian   None    Review of External Notes   Reviewed PCP note along with labs and CT findings from clinic.    Urinalysis negative.  CBC with white count 15.4.  BMP with sodium 145, potassium 3.8, creatinine 0.72 CT scan shows sigmoid diverticulitis with perforation and small pneumoperitoneum.  No abscess.  Patient made aware of left kidney cyst, pancreatic cyst, liver cyst.    Past Medical History     Medical History and Problem List   Past Medical History:   Diagnosis Date    Blood clot in vein 09/18/2012    Cushing's syndrome (H) 2005       Medications   No current outpatient medications on file.      Surgical History   Past Surgical History:   Procedure Laterality Date    APPENDECTOMY      LAPAROSCOPIC CHOLECYSTECTOMY  2/26/2012    Procedure:LAPAROSCOPIC CHOLECYSTECTOMY; Laparoscopic Cholecystectomy; Surgeon:[facility]; Location: OR       Physical Exam     Patient Vitals for the past 24 hrs:   BP Temp Temp src Pulse Resp SpO2 Weight   09/09/24 1952 131/83 97.5  F (36.4  C) Temporal 107 12 100 % --   09/09/24 1951 -- -- -- -- 12 -- 61.7 kg (136 lb)     Physical Exam  General: Resting on the bed.  Head: No obvious trauma to head.  Ears, Nose, Throat:  External ears normal.   Nose normal.   Eyes:  Conjunctivae clear.  Pupils are equal, round, and reactive.   Neck: Normal range of motion.  Neck supple.   CV: Regular rate and rhythm.  No murmurs.      Respiratory: Effort normal and breath sounds normal.  No wheezing or crackles.   Gastrointestinal: Soft.  No distension. There is LLQ tenderness.  There is no rigidity, no rebound and no guarding.   Musculoskeletal: No cva tenderness  Neuro: Alert. Moving all extremities appropriately.  Normal speech.    Skin: Skin is warm and dry.  No rash noted.       Diagnostics     Lab Results   Labs Ordered and Resulted from Time of ED Arrival to Time of ED Departure   LACTIC ACID WHOLE BLOOD - Normal       Result Value    Lactic Acid 1.3         Imaging   No orders to display       Independent Interpretation   None    ED Course      Medications Administered   Medications   piperacillin-tazobactam (ZOSYN) 4.5 g vial to attach to  mL bag (has no administration in time range)   lidocaine 1 % 0.1-1 mL (has no administration in time range)   lidocaine (LMX4) cream (has no administration in time range)   sodium chloride (PF) 0.9% PF flush 3 mL (has no administration in time range)   sodium chloride (PF) 0.9% PF flush 3 mL (has no administration in time range)   hydrALAZINE (APRESOLINE) tablet 10 mg (has no administration in time range)     Or   hydrALAZINE (APRESOLINE) injection 10 mg (has no administration in time range)   acetaminophen (TYLENOL) tablet 650 mg (has no administration in time range)     Or   acetaminophen (TYLENOL) Suppository 650 mg (has no administration in time range)   ondansetron (ZOFRAN ODT) ODT tab 4 mg (has no administration in time range)     Or   ondansetron (ZOFRAN) injection 4 mg (has no administration in time range)   benzocaine-menthol (CHLORASEPTIC) 6-10 MG lozenge 1 lozenge (has no administration in time range)   HYDROmorphone (DILAUDID) injection 0.2 mg (has no administration in time range)   HYDROmorphone (DILAUDID)  injection 0.4 mg (has no administration in time range)   prochlorperazine (COMPAZINE) injection 10 mg (has no administration in time range)     Or   prochlorperazine (COMPAZINE) tablet 10 mg (has no administration in time range)     Or   prochlorperazine (COMPAZINE) suppository 25 mg (has no administration in time range)   oxyCODONE IR (ROXICODONE) half-tab 2.5 mg (has no administration in time range)   oxyCODONE (ROXICODONE) tablet 5 mg (has no administration in time range)   lactated ringers infusion (has no administration in time range)   enoxaparin ANTICOAGULANT (LOVENOX) injection 40 mg (has no administration in time range)   piperacillin-tazobactam (ZOSYN) 3.375 g vial to attach to  mL bag (has no administration in time range)   sodium chloride 0.9% BOLUS 1,000 mL (1,000 mLs Intravenous $New Bag 9/9/24 2000)       Procedures   Procedures     Discussion of Management   Admitting Hospitalist, see below    ED Course   ED Course as of 09/09/24 2222   Mon Sep 09, 2024   2048 I spoke with Dr Gutierrez for admission        Additional Documentation  None    Medical Decision Making / Diagnosis     CMS Diagnoses: None    MIPS       None    MDM   Dilma Navarro is a 59 year old female who presents emergency department with abnormal CT scan from outside facility.  Vital signs are reassuring, mild tachycardia, IV fluids given.  Broad differential was considered include not limited to peritonitis, severe sepsis, septic shock, dehydration, abscess, etc.  Overall patient presents from the clinic with outside findings.  White count is elevated concerning for sepsis.  CT shows evidence of diverticulitis with perforation.  Exam is relatively benign with mild left lower quadrant tenderness.  No rebound or guarding.  Lactate is normal not concerning for severe sepsis or septic shock.  CMP without acute electrolyte, metabolic or renal dysfunction, mild LFT abnormalities will need outpatient follow-up.  Outpatient  provider had spoken with Dr. Jeronimo from colorectal surgery.  They are aware of this patient coming to the hospital for inpatient admission and IV antibiotics.  No indication for emergent surgical procedure at this time but rather consultation while patient is hospitalized.  I discussed with the hospitalist who agrees.  Zosyn administered.  Patient will be admitted.    Disposition   The patient was admitted to the hospital.     Diagnosis     ICD-10-CM    1. LLQ abdominal pain  R10.32       2. Diverticulitis of colon with perforation  K57.20            Discharge Medications   Current Discharge Medication List            MD Lewis Herrmann Jennifer L, MD  09/09/24 5036

## 2024-09-10 NOTE — ED TRIAGE NOTES
Positive for COVID last week      Patient had a CT scan today and was told that she needed to come in for IV abx and IV fluids.  She is not in a lot of pain at the moment. All blood work was done at the clinic and her WBC was 15.4

## 2024-09-10 NOTE — PHARMACY-ADMISSION MEDICATION HISTORY
Pharmacist Admission Medication History    Admission medication history is complete. The information provided in this note is only as accurate as the sources available at the time of the update.    Information Source(s): Patient, Family member, and CareEverywhere/SureScripts via in-person    Pertinent Information:   -- Patient switched from prednisone to hydrocortisone around 9/3 per notes  -- Patient reports she was actually taking 80 mg of HC in the AM and 40 in the PM on 9/6, 9/7, 9/8 due to having COVID and being informed to double HC dose when sick. They are wondering if she should be doubling her dose now with diverticulitis. She took 40 mg this morning and 20 mg around 2pm (normally prescribed dose)  -- Augmentin started 9/5 for 7 day course - she has 5 doses of this left.    Changes made to PTA medication list:  Added: Everything except cholecalciferol  Deleted: Norco  Changed: None    Allergies reviewed with patient and updates made in EHR: yes    Medication History Completed By: Jean Pierre Parry RPH 9/9/2024 10:33 PM    PTA Med List   Medication Sig Last Dose    amoxicillin-clavulanate (AUGMENTIN) 875-125 MG tablet Take 1 tablet by mouth 2 times daily. 9/9/2024 at AM - 5 doses remaining    Cholecalciferol (VITAMIN D-3) 5000 UNITS TABS Take 5,000 Units by mouth daily. 9/9/2024    CHROMIUM PO Take 2 capsules by mouth daily. (Formulation and dose unknown) 9/9/2024    dexAMETHasone (DECADRON) 4 mg/mL Inject 1 mL into the muscle daily as needed. For emergency use when unable to take oral steroids Unknown at new - not used    fish oil-omega-3 fatty acids 1000 MG capsule Take 2 g by mouth daily. 9/9/2024    hydrocortisone (CORTEF) 10 MG tablet Take 4 tablets (40 mg) by mouth in the morning and 2 tablets (20 mg) by mouth in the afternoon (2 pm) x 7 days then may decrease to 30 mg in the AM and 20 mg in the afternoon if able. If unable to decrease dose resume original dosing and follow up in 2-3 weeks. 9/9/2024 at Took  both doses today    LACTOBACILLUS PROBIOTIC PO Take 1 capsule by mouth daily. 9/9/2024    Magnesium 500 MG CAPS Take 2 capsules by mouth daily. (Exact formulation not known) 9/9/2024    Vitamin A 7.5 MG (55095 UT) CAPS Take 25,000 Units by mouth daily. 9/9/2024    Zinc Sulfate 220 (50 Zn) MG capsule Take 220 mg by mouth daily. (50 mg elemental Zinc) 9/9/2024

## 2024-09-10 NOTE — PLAN OF CARE
Goal Outcome Evaluation:         Summary:  9-9-24, [id number]    Settled. VSS on RA ex slight hypertension. Lungs clear. Abd discomfort. No n/v. No SOB. IV running antibx. Double skin check done with Estephania Mars. Having abd pain waiting for pharmacy to verify meds.

## 2024-09-10 NOTE — PLAN OF CARE
Goal Outcome Evaluation:      Plan of Care Reviewed With: patient    Overall Patient Progress: improvingOverall Patient Progress: improving     Summary: Here with Acute sigmoid diverticulitis with perforation and small to moderate pneumoperitoneum  Behavior & Aggression: Green  Fall Risk: No  Orientation: A&Ox4  ABNL VS/O2: VSS on RA  ABNL Labs: Potassium protocols- recheck in AM  Pain Management: PRN Tylenol & heat packs  Bowel/Bladder: Voiding well. BS hypoactive- passing gas. Pt reports LLQ tenderness with palpation  IV: PIV infusing LR at 100 mL/hr with int abx  Diet: NPO. Denies N/V  Activity Level: Independent  Tests/Procedures: Colorectal Surgery Consult  Anticipated  DC Date: Pending

## 2024-09-10 NOTE — CONSULTS
[facility]  Colon and Rectal Surgery Consult Note  Name: Dilma Navarro    MRN: [id number]  YOB: 1965    Age: 59 year old  Date of admission: 9/9/2024  Primary care provider: Valentino Thompson     Requesting Physician:  Dr. Gutierrez  Reason for consult:  Perforated diverticulitis           History of Present Illness:   Dilma Navarro is a 59 year old female with a history of Cushing's disease s/p pituitary adenoma resection on 7/31/2024, adrenal insufficiency on hydrocortisone, previous DVT, recent COVID, seen at the request of Dr. Gutierrez, who presents with abdominal pain.  She had 2 weeks of intermittent left lower quadrant abdominal pain which became acutely worse 2 nights ago, prompting her to see her PCP yesterday.  Labs were done and were notable for WBC 15.4, normal Hgb, mildly elevated CRP, ESR, and LFTs.  An outpatient CT abdomen/pelvis was done which showed sigmoid diverticulitis with small to moderate pneumoperitoneum, no abscess or obstruction.  There were also small incidental lesions noted in the kidney, pancreas, and liver.  Given her CT findings she was advised to present to the ED to be admitted for further management.  Lactate in the ED was 1.3.  She was initially mildly tachycardic to 107, this has normalized today.  Repeat labs today showed a normalized WBC at 9.7, Hgb down to 11.6.  Remains hemodynamically stable and afebrile.    Dilma reports she is feeling notably better today.  Her abdominal pain is much improved.  Denies nausea or vomiting.  No fevers or chills at home.  Her bowel movements are regular at baseline and she denies any recent changes.  No melena or hematochezia.  She has never had diverticulitis before to her knowledge.  Her paternal grandmother had colon cancer.      Colonoscopy History:  None. Did cologuard 2-3 years ago.    Past abdominal surgery: Lap cholecystectomy, appendectomy, C section            Past Medical History:      Past Medical History:   Diagnosis Date    Adrenal insufficiency (H24)     Blood clot in vein 09/18/2012    2 blood clots in L calf    Cushing's syndrome (H24) 01/01/2005             Past Surgical History:     Past Surgical History:   Procedure Laterality Date    APPENDECTOMY      LAPAROSCOPIC CHOLECYSTECTOMY  2/26/2012    Procedure:LAPAROSCOPIC CHOLECYSTECTOMY; Laparoscopic Cholecystectomy; Surgeon:MERCY DE LA VEGA; Location: OR               Social History:     Social History     Tobacco Use    Smoking status: Never    Smokeless tobacco: Never   Substance Use Topics    Alcohol use: Yes     Alcohol/week: 2.5 standard drinks of alcohol     Types: 3 Glasses of wine per week     Comment: 2 glasses wine weekly - recently stopped             Family History:     Family History   Problem Relation Age of Onset    Cancer - colorectal Paternal Grandmother         in her 70s             Allergies:     Allergies   Allergen Reactions    No Known Drug Allergy              Medications:     Current Facility-Administered Medications   Medication Dose Route Frequency Provider Last Rate Last Admin    hydrocortisone sodium succinate PF (solu-CORTEF) injection 40 mg  40 mg Intravenous Q24H Matias Gutierrez MD        hydrocortisone sodium succinate PF (solu-CORTEF) injection 80 mg  80 mg Intravenous Daily Matias Gutierrez MD   80 mg at 09/10/24 0913    piperacillin-tazobactam (ZOSYN) 3.375 g vial to attach to  mL bag  3.375 g Intravenous Q6H Matias Gutierrez MD   3.375 g at 09/10/24 0531    sodium chloride (PF) 0.9% PF flush 3 mL  3 mL Intracatheter Q8H Matias Gutierrez MD   3 mL at 09/10/24 0531             Review of Systems:   A comprehensive greater than 10 system review of systems was carried out.  Pertinent positives and negatives are noted above.  Otherwise negative for contributory info.            Physical Exam:     Blood pressure (!) 146/87, pulse 65, temperature 97.4  F (36.3  C), resp. rate  "14, height 1.651 m (5' 5\"), weight 61.7 kg (136 lb), SpO2 97%.    Intake/Output Summary (Last 24 hours) at 9/10/2024 0925  Last data filed at 9/10/2024 0800  Gross per 24 hour   Intake 564 ml   Output 1050 ml   Net -486 ml     Exam:  General - Awake alert and oriented, appears stated age  Pulm - Non-labored breathing with normal respiratory effort  CVS - reg rate and rhythm, no peripheral edema  Abd - soft, non-distended, minimally tender in LLQ.  No guarding, rigidity or peritoneal signs.   Neuro - CN II-XII grossly intact  Musculoskeletal - extremities with no clubbing, cyanosis or edema; able to ambulate  Psych - responsive, alert, cooperative; oriented x3; appropriate mood and affect  External/skin - inspection reveals no rashes, lesions or ulcers, normal coloring             Data Reviewed:   CT abdomen/pelvis with contrast (done at [facility])    EXAM: CT ABDOMEN AND PELVIS WITH CONTRAST    CLINICAL INFORMATION: Left lower quadrant abdominal pain, rule out diverticulitis and perforated viscus    TECHNICAL INFORMATION: The patient was given 3 cups of water containing a total of 50 mL Omnipaque 300 to drink prior to the exam. After IV injection of 100 mL of Omnipaque 300, axial sections were obtained through the abdomen and pelvis. Reformations were obtained in the coronal and sagittal planes. No immediate complications.    COMPARISON: None    INTERPRETATION:    Lung Bases: Calcified granuloma in the right lower lobe. Bibasilar atelectasis.    Abdomen:    A few hypoattenuating lesions in the right hepatic lobe which likely reflect cysts measuring up to 1.8 cm. An additional 9 mm hypoattenuating lesion in the caudate (series 4 image 35). The gallbladder is surgically absent. No biliary duct dilation.    A calcification near the uncinate process of the pancreas. Several cystic lesions in the pancreatic tail measuring up to 2.1 x 1.5 cm (series 4 image 34).    A nonspecific septated hypoattenuating lesion in " the inferior spleen measures 1.5 x 0.9 cm (series 7 image 46). Peripheral calcification is also present along the lesion.    Normal adrenal glands.    The kidneys are symmetrically enhancing. A hypoattenuating lesion in the interpolar region of the left kidney measures 2.1 x 1.3 x 1.5 cm (series 4 image 53). There appear to be enhancing septation and peripheral calcification. No hydronephrosis.    Sigmoid diverticulosis. Pericolonic inflammation in the left lower quadrant region with several foci of gas. No pericolonic abscess. Mild to moderate stool burden. Small duodenal diverticulum.    Pneumoperitoneum is noted. No ascites.    No abdominal or pelvic lymphadenopathy. No ascites.    Minimal atherosclerosis of the distal abdominal aorta.    Pelvis:    No bladder wall thickening. The uterus is present. Small fundal fibroid measures 7 mm.    Musculoskeletal: No suspicious osseous findings.    CONCLUSION:    Sigmoid diverticulitis in the left lower quadrant region complicated by perforation with small to moderate volume pneumoperitoneum. No pericolonic abscess.    A 2.1 cm hypoattenuating lesion in the left kidney which is incompletely characterized on this exam and may reflect a complex cystic or solid lesion. Recommend further evaluation with MRI abdomen without and with contrast.    Several cystic lesions in the pancreatic tail measuring up to 2.1 cm which commonly reflect epithelial cysts or side branch IPMN. These can also be evaluated on MRI abdomen with MRCP.    A 9 mm hypoattenuating lesion in the caudate lobe of the liver which is nonspecific, possibly a hemangioma and can be further evaluated on MRI of the abdomen. Additional scattered liver cysts.    An additional nonspecific hypoattenuating lesion in the spleen. Attention on follow-up MRI.    Moderate sized hiatal hernia.    Uterine fibroid.    [facility] is committed to minimizing radiation exposure while maintaining high-quality CT images.  Technologists adjust the mA and/or kV according to each patient's size to optimize dose. Since we began voluntarily reporting to the American College of [employer]'s Dose Index Registry, our average CT doses have been consistently lower than the national average.    Results were discussed with Dr. Thompson via telephone on 9/9/2024 at 3:29 PM.    Recent Labs   Lab 09/10/24  0725   WBC 9.7   HGB 11.6*   HCT 36.2   MCV 95        Recent Labs   Lab 09/10/24  0725   *   POTASSIUM 3.8   CHLORIDE 111*   CO2 26   ANIONGAP 12   GLC 77   BUN 7.9*   CR 0.76   GFRESTIMATED 89   SEGUN 9.2         Assessment and Plan:   Dilma Navarro is a 59 year old female with a history of Cushing's disease s/p pituitary adenoma resection about 6 weeks ago with subsequent adrenal insufficiency on hydrocortisone, recent COVID infection, who was evaluated outpatient yesterday after 2 weeks of intermittent left lower quadrant abdominal pain.  Workup revealed leukocytosis, mildly elevated inflammatory markers and LFTs, and CT findings consistent with acute sigmoid diverticulitis with perforation with small to moderate pneumoperitoneum.  On review of the images (available in [facility] portal) there are a few gas bubbles surrounding the colon as well as some gas up by the liver.  She was admitted for further evaluation and management.  WBC has normalized today and she remains hemodynamically stable and afebrile.  Abdominal pain is much improved and she is minimally tender on exam.    No indication for urgent surgical intervention at this time.  Recommend continuing conservative management with bowel rest, IV antibiotics, IV fluids, and serial abdominal exams.  If she is improving clinically she can likely discharge in 2 to 3 days oral antibiotics.  If she is not making much progress would repeat CT to assess for drainable abscess.  If she worsens acutely or does not improve with conservative measures she may need surgery this  admission, we reviewed that this typically entails an open sigmoid colectomy with end colostomy.  The colostomy would be temporary and could be reversed in 6 to 12 months.  Assuming she continues to improve and gets through this episode with conservative measures, would recommend a colonoscopy in 6 to 8 weeks.  CRS will continue to follow.    Plan:  Admit to medicine  Surgery: No indication for urgent surgery at this time.  Diet: Clear liquids  IV Fluids: Continue  Antibiotics:  Continue Zosyn  Medications: Per medicine  I&O s:  strict I&O s   Labs:   - Reviewed: Yes  - Ordered: Trend WBC, Hgb   Imaging:   - Dr. Glover and myself have personally viewed: CT abd/pelvis  Activity:  OOB, ambulate as able  DVT prophylaxis: Per medicine  This plan has been discussed with Dr. Glover    Patient specific identified risk factors considered as part of today s evaluation include: Prior abdominal surgery, current steroids     Additional history obtained from patient, chart review, outside clinic and radiology records.  Time spent on consultation: 45 minutes, greater than 50% spent on counseling and/or coordination of care.      John Corbin PA-C  Colorectal Physician Assistant    [facility]  [address] [facility] [address]  [place] [address]  T: [phone]  F: [phone]

## 2024-09-10 NOTE — H&P
[facility]    History and Physical - Hospitalist Service       Date of Admission:  9/9/2024    Assessment & Plan   Dilma Navarro is a 59 year old female with past medical history significant for Cushing's disease s/p pituitary resection with adrenal insufficiency who presents with abdominal pain with outpatient CT demonstrating perforated diverticulitis. Admitted on 9/9/2024.     Acute sigmoid diverticulitis with perforation and small to moderate pneumoperitoneum   *Presents with left-lower quadrant pain   *WBC 15.4 (outside lab). Afebrile. Lactic acid normal. Exam non-surgical at this time.   *CT abd/pelvis (outside CT) with sigmoid diverticulitis with perforation and small to moderate volume pneumoperitoneum without abscess. Primary care provider had already discussed with CRS (Dr. Glover) who recommended admission for IV antibiotics and will consult on patient.   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    - Increase hydrocortisone to 80 mg AM, 40 mg PM x3 days, then reduce pending clinical course    Pancreatic cysts  Liver cyst  Splenic lesion  Left renal lesion  Incidentally noted on outpatient CT, see report in chart. Radiology recommending MRI abdomen for follow-up.  - Outpatient follow-up for MRI        Diet: NPO  DVT Prophylaxis: Pneumatic Compression Devices, consider chemical prophylaxis if no procedure/surgery planned  Moore Catheter: Not  present  Code Status: Full Code     Disposition Plan   Admit to inpatient. Anticipate greater than or equal to 2 midnights prior to discharge.     Entered: Matias Gutierrez MD 09/09/2024, 10:43 PM     The patient's care was discussed with the ED provider, patient and patient's      Clinically Significant Risk Factors Present on Admission                                                Matias Gutierrez MD  [facility]    ______________________________________________________________________    Chief Complaint   Left lower quadrant pain    History of Present Illness   Dilma Navarro is a 59 year old female who presents with the above chief complaint.    History is obtained from the patient, discussion with ED provider and review of medical record.The patient reports for about two weeks she has had low grade intermittent pain in her LLQ. The evening prior her pain acutely worsened and she was seen by her primary care provider on 9/9/2024, who ordered a CT scan which demonstrated sigmoid diverticulitis in the left lower quadrant complicated by perforation with small to moderate volume pneumoperitoneum.  Her case was discussed with colorectal surgery, who recommended admission for IV antibiotics.  She presented to the emergency department for further evaluation.  She denies any nausea or vomiting.  She denies any fevers, felt some chills in the emergency department.  She denies any prior history of diverticulitis.  She reports she recently had her steroids increased due to COVID infection, recently decreased to 40 mg + 20 mg on day of presentation.     In the Emergency Department, the patient was seen by Dr. Saucedo, with whom I discussed the patient's presenting symptoms and emergency department course.  Initial vital signs were a temperature of 97.5F, , /83, RR 12, SpO2 100% on room air. Pertinent work-up as noted in A&P. The patient received piperacillin-tazobactam IV  and Hospitalists were contacted for admission to the hospital.     Past Medical History    I have reviewed this patient's medical history and updated it with pertinent information if needed.   Past Medical History:   Diagnosis Date    Blood clot in vein 09/18/2012    2 blood clots in L calf    Cushing's syndrome (H) 2005       Past Surgical History   I have reviewed this patient's surgical history and updated it with pertinent information if needed.  Past Surgical History:   Procedure Laterality Date    APPENDECTOMY      LAPAROSCOPIC CHOLECYSTECTOMY  2/26/2012    Procedure:LAPAROSCOPIC CHOLECYSTECTOMY; Laparoscopic Cholecystectomy; Surgeon:MERCY DE LA VEGA; Location: OR         Social History   I have reviewed this patient's social history and updated it with pertinent information if needed.  Social History     Tobacco Use    Smoking status: Never    Smokeless tobacco: Never   Substance Use Topics    Alcohol use: Yes     Alcohol/week: 2.5 standard drinks of alcohol     Types: 3 Glasses of wine per week     Comment: 2 glasses wine weekly - recently stopped    Drug use: No        Family History   I have reviewed this patient's family history and updated it with pertinent information if needed.   Family History   Problem Relation Age of Onset    Cancer - colorectal Paternal Grandmother         in her 70s        Prior to Admission Medications  -   Prior to Admission Medications   Prescriptions Last Dose Informant Patient Reported? Taking?   CHROMIUM PO 9/9/2024  Yes Yes   Sig: Take 2 capsules by mouth daily. (Formulation and dose unknown)   Cholecalciferol (VITAMIN D-3) 5000 UNITS TABS 9/9/2024  Yes Yes   Sig: Take 5,000 Units by mouth daily.   LACTOBACILLUS PROBIOTIC PO 9/9/2024  Yes Yes   Sig: Take 1 capsule by mouth daily.   Magnesium 500 MG CAPS 9/9/2024  Yes Yes   Sig: Take 2 capsules by mouth daily. (Exact formulation not known)   Vitamin A 7.5 MG (90069 UT) CAPS 9/9/2024  Yes Yes   Sig: Take 25,000 Units by mouth  daily.   Zinc Sulfate 220 (50 Zn) MG capsule 9/9/2024  Yes Yes   Sig: Take 220 mg by mouth daily. (50 mg elemental Zinc)   amoxicillin-clavulanate (AUGMENTIN) 875-125 MG tablet 9/9/2024 at AM - 5 doses remaining  Yes Yes   Sig: Take 1 tablet by mouth 2 times daily.   dexAMETHasone (DECADRON) 4 mg/mL Unknown at new - not used  Yes Yes   Sig: Inject 1 mL into the muscle daily as needed. For emergency use when unable to take oral steroids   fish oil-omega-3 fatty acids 1000 MG capsule 9/9/2024  Yes Yes   Sig: Take 2 g by mouth daily.   hydrocortisone (CORTEF) 10 MG tablet 9/9/2024 at Took both doses today  Yes Yes   Sig: Take 4 tablets (40 mg) by mouth in the morning and 2 tablets (20 mg) by mouth in the afternoon (2 pm) x 7 days then may decrease to 30 mg in the AM and 20 mg in the afternoon if able. If unable to decrease dose resume original dosing and follow up in 2-3 weeks.      Facility-Administered Medications: None     Allergies   Allergies   Allergen Reactions    No Known Drug Allergy        Physical Exam   Vital Signs: Temp: 97.5  F (36.4  C) Temp src: Temporal BP: 131/83 Pulse: 107   Resp: 12 SpO2: 100 % O2 Device: None (Room air)    Weight: 136 lbs 0 oz    Constitutional: Well-appearing, NAD  Respiratory: Clear to auscultation bilaterally, good air movement, normal effort   Cardiovascular: RRR, no m/r/g. No peripheral edema.   GI: Soft, focal tenderness in LLQ without rebound tenderness or guarding.   Skin: Warm, dry   Neurologic: Alert. Responding to questions appropriately. Following commands.    Psychiatric: Normal affect, appropriate      Data   Data reviewed today: I reviewed all medications, new labs and imaging results over the last 24 hours. I personally reviewed no images or EKG's today.    No lab results found in last 7 days.    No results found for this or any previous visit (from the past 24 hour(s)).

## 2024-09-10 NOTE — PLAN OF CARE
Goal Outcome Evaluation:   Date & Time: 9/10/24 7a-7p  Dx: Sigmoid divertic w/ perf  Behavior & Aggression: Green  Fall Risk: No  Orientation:A/O x4  ABNL VS/O2:Replacement protocol; WDL; Hgb 11.6  ABNL Labs: NA  Pain Management:Tylenol; declines narcotics   Bowel/Bladder: Voiding and having loose BM's  Drains: NA  Wounds/incisions: NA  Diet:Clears, tolerating; denies nausea  Activity Level: IND  Tests/Procedures: NA  Anticipated  DC Date: Pening clinical progress 1-2 days  Significant Information: LLQ pain well managed with mobility, heat packs and tylenol. Taking steroids course.       Plan of Care Reviewed With: patient, spouse    Overall Patient Progress: improvingOverall Patient Progress: improving

## 2024-09-10 NOTE — PROGRESS NOTES
[facility]    Medicine Progress Note - Hospitalist Service    Date of Admission:  9/9/2024    Assessment & Plan      Dilma Navarro is a 59 year old female with past medical history significant for Cushing's disease s/p pituitary resection with adrenal insufficiency who presents with abdominal pain with outpatient CT demonstrating perforated diverticulitis. Admitted on 9/9/2024.      Acute sigmoid diverticulitis with perforation and small to moderate pneumoperitoneum   *Presents with left-lower quadrant pain   *WBC 15.4 (outside lab). Afebrile. Lactic acid normal. Exam non-surgical at this time.   *CT abd/pelvis (outside CT) with sigmoid diverticulitis with perforation and small to moderate volume pneumoperitoneum without abscess. Primary care provider had already discussed with CRS (Dr. Glover) who recommended admission for IV antibiotics and will consult on patient.   *Exam non-surgical at present  - Colorectal surgery consult  -CLD recommended per surgery, continue to monitor overnight.  - IVF  - Piperacillin-tazobactam IV   - Ondansetron IV/PO, prochlorperazine IV/PO PRN  - Oxycodone PO, hydromorphone IV PRN        Cushing's disease s/p endoscopic transsphenoidal pituitary lesion resection x2 with sellar reconstruction for CSF leak   Recently complicated by glucocortoid withdrawal syndrome and COVID infection requiring increased doses, reports she was advised to double her dose by her endocrinology in acute illness due to relatively high baseline doses.    - Increase hydrocortisone to 80 mg AM, 40 mg PM x3 days, then reduce pending clinical course     Pancreatic cysts  Liver cyst  Splenic lesion  Left renal lesion  Incidentally noted on outpatient CT, see report in chart. Radiology recommending MRI abdomen for follow-up.  - Outpatient follow-up for MRI            Diet: Clear Liquid Diet    DVT Prophylaxis: Pneumatic Compression Devices  Moore Catheter: Not present  Lines: None      Cardiac Monitoring: None  Code Status: Full Code      Clinically Significant Risk Factors Present on Admission         # Hypernatremia: Highest Na = 149 mmol/L in last 2 days, will monitor as appropriate                                   Disposition Plan     Medically Ready for Discharge: Anticipated Tomorrow             Adebayo Haji MD  Hospitalist Service  [facility]  Securely message with [employer] (more info)  Text page via [employer] Paging/Directory   ______________________________________________________________________    Interval History   Patient is resting in bed, denies any complaints of chest pain dizziness lightheadedness nausea or vomiting.  Abdominal pain seems to have improved compared to prior.  No significant fever or chills noted overnight.  Diet has been advanced to clear liquids per colorectal surgery, patient is hoping to have some juice.    Physical Exam   Vital Signs: Temp: 97.4  F (36.3  C) Temp src: Oral BP: (!) 146/87 Pulse: 65   Resp: 14 SpO2: 97 % O2 Device: None (Room air)    Weight: 136 lbs 0 oz    Constitutional: Awake, alert, cooperative, no apparent distress  Respiratory: Clear to auscultation bilaterally, no crackles or wheezing  Cardiovascular: Regular rate and rhythm, normal S1 and S2, and no murmur noted  GI: Normal bowel sounds, soft, non-distended, non-tender  Skin/Integumen: No rashes, no cyanosis, no edema      Medical Decision Making

## 2024-09-10 NOTE — PROVIDER NOTIFICATION
MD Notification    Notified Person: MD    Notified Person Name: sandra    Notification Date/Time: 9-9-24, 2209    Notification Interaction: candie    Purpose of Notification:   Hi there, a new patient just came up and said she wants pain meds and is in pain 8/10, currently no med orders in, if you could add a prn pain med!?    Orders Received:    Comments:

## 2024-09-11 LAB
ANION GAP SERPL CALCULATED.3IONS-SCNC: 13 MMOL/L (ref 7–15)
BUN SERPL-MCNC: 5.2 MG/DL (ref 8–23)
CALCIUM SERPL-MCNC: 8.9 MG/DL (ref 8.8–10.4)
CHLORIDE SERPL-SCNC: 109 MMOL/L (ref 98–107)
CREAT SERPL-MCNC: 0.66 MG/DL (ref 0.51–0.95)
EGFRCR SERPLBLD CKD-EPI 2021: >90 ML/MIN/1.73M2
ERYTHROCYTE [DISTWIDTH] IN BLOOD BY AUTOMATED COUNT: 13.2 % (ref 10–15)
GLUCOSE SERPL-MCNC: 86 MG/DL (ref 70–99)
HCO3 SERPL-SCNC: 24 MMOL/L (ref 22–29)
HCT VFR BLD AUTO: 38.4 % (ref 35–47)
HGB BLD-MCNC: 12.8 G/DL (ref 11.7–15.7)
MCH RBC QN AUTO: 31.1 PG (ref 26.5–33)
MCHC RBC AUTO-ENTMCNC: 33.3 G/DL (ref 31.5–36.5)
MCV RBC AUTO: 93 FL (ref 78–100)
PLATELET # BLD AUTO: 397 10E3/UL (ref 150–450)
POTASSIUM SERPL-SCNC: 2.7 MMOL/L (ref 3.4–5.3)
RBC # BLD AUTO: 4.12 10E6/UL (ref 3.8–5.2)
SODIUM SERPL-SCNC: 146 MMOL/L (ref 135–145)
WBC # BLD AUTO: 17 10E3/UL (ref 4–11)

## 2024-09-11 PROCEDURE — 99232 SBSQ HOSP IP/OBS MODERATE 35: CPT | Performed by: INTERNAL MEDICINE

## 2024-09-11 PROCEDURE — 99232 SBSQ HOSP IP/OBS MODERATE 35: CPT | Mod: FS | Performed by: COLON & RECTAL SURGERY

## 2024-09-11 PROCEDURE — 80048 BASIC METABOLIC PNL TOTAL CA: CPT | Performed by: INTERNAL MEDICINE

## 2024-09-11 PROCEDURE — 250N000013 HC RX MED GY IP 250 OP 250 PS 637: Performed by: INTERNAL MEDICINE

## 2024-09-11 PROCEDURE — 250N000011 HC RX IP 250 OP 636: Performed by: INTERNAL MEDICINE

## 2024-09-11 PROCEDURE — 258N000003 HC RX IP 258 OP 636: Performed by: INTERNAL MEDICINE

## 2024-09-11 PROCEDURE — 120N000001 HC R&B MED SURG/OB

## 2024-09-11 PROCEDURE — 85027 COMPLETE CBC AUTOMATED: CPT | Performed by: INTERNAL MEDICINE

## 2024-09-11 PROCEDURE — 36415 COLL VENOUS BLD VENIPUNCTURE: CPT | Performed by: INTERNAL MEDICINE

## 2024-09-11 PROCEDURE — 99207 PR APP CREDIT; MD BILLING SHARED VISIT: CPT | Mod: FS | Performed by: PHYSICIAN ASSISTANT

## 2024-09-11 RX ORDER — MULTIPLE VITAMINS W/ MINERALS TAB 9MG-400MCG
1 TAB ORAL DAILY
Status: DISCONTINUED | OUTPATIENT
Start: 2024-09-11 | End: 2024-09-13 | Stop reason: HOSPADM

## 2024-09-11 RX ORDER — POTASSIUM CHLORIDE 1500 MG/1
20 TABLET, EXTENDED RELEASE ORAL ONCE
Status: COMPLETED | OUTPATIENT
Start: 2024-09-11 | End: 2024-09-11

## 2024-09-11 RX ORDER — POTASSIUM CHLORIDE 1500 MG/1
40 TABLET, EXTENDED RELEASE ORAL ONCE
Status: COMPLETED | OUTPATIENT
Start: 2024-09-11 | End: 2024-09-11

## 2024-09-11 RX ADMIN — PROCHLORPERAZINE EDISYLATE 10 MG: 5 INJECTION INTRAMUSCULAR; INTRAVENOUS at 06:41

## 2024-09-11 RX ADMIN — PIPERACILLIN AND TAZOBACTAM 3.38 G: 3; .375 INJECTION, POWDER, FOR SOLUTION INTRAVENOUS at 06:45

## 2024-09-11 RX ADMIN — PIPERACILLIN AND TAZOBACTAM 3.38 G: 3; .375 INJECTION, POWDER, FOR SOLUTION INTRAVENOUS at 14:10

## 2024-09-11 RX ADMIN — PIPERACILLIN AND TAZOBACTAM 3.38 G: 3; .375 INJECTION, POWDER, FOR SOLUTION INTRAVENOUS at 00:46

## 2024-09-11 RX ADMIN — ONDANSETRON 4 MG: 2 INJECTION INTRAMUSCULAR; INTRAVENOUS at 09:03

## 2024-09-11 RX ADMIN — SODIUM CHLORIDE, POTASSIUM CHLORIDE, SODIUM LACTATE AND CALCIUM CHLORIDE: 600; 310; 30; 20 INJECTION, SOLUTION INTRAVENOUS at 07:17

## 2024-09-11 RX ADMIN — ONDANSETRON 4 MG: 2 INJECTION INTRAMUSCULAR; INTRAVENOUS at 01:29

## 2024-09-11 RX ADMIN — POTASSIUM CHLORIDE 40 MEQ: 1500 TABLET, EXTENDED RELEASE ORAL at 19:22

## 2024-09-11 RX ADMIN — POTASSIUM CHLORIDE 20 MEQ: 1500 TABLET, EXTENDED RELEASE ORAL at 20:30

## 2024-09-11 RX ADMIN — HYDROCORTISONE SODIUM SUCCINATE 40 MG: 100 INJECTION, POWDER, FOR SOLUTION INTRAMUSCULAR; INTRAVENOUS at 15:19

## 2024-09-11 RX ADMIN — PIPERACILLIN AND TAZOBACTAM 3.38 G: 3; .375 INJECTION, POWDER, FOR SOLUTION INTRAVENOUS at 19:22

## 2024-09-11 RX ADMIN — ACETAMINOPHEN 650 MG: 325 TABLET ORAL at 01:24

## 2024-09-11 RX ADMIN — HYDROCORTISONE SODIUM SUCCINATE 80 MG: 100 INJECTION, POWDER, FOR SOLUTION INTRAMUSCULAR; INTRAVENOUS at 08:52

## 2024-09-11 ASSESSMENT — ACTIVITIES OF DAILY LIVING (ADL)
ADLS_ACUITY_SCORE: 23

## 2024-09-11 NOTE — PROGRESS NOTES
[facility]  Hospitalist Progress Note  Young Vital MD  09/11/2024    Assessment & Plan   Dilma Navarro is a 59 year old female with past medical history significant for Cushing's disease s/p pituitary resection with adrenal insufficiency who presents with abdominal pain with outpatient CT demonstrating perforated diverticulitis. Admitted on 9/9/2024.      Acute sigmoid diverticulitis with perforation and small to moderate pneumoperitoneum   *Presents with left-lower quadrant pain   *WBC 15.4 (outside lab). Afebrile. Lactic acid normal. Exam non-surgical at this time.   *CT abd/pelvis (outside CT) with sigmoid diverticulitis with perforation and small to moderate volume pneumoperitoneum without abscess. Primary care provider had already discussed with CRS (Dr. Glover) who recommended admission for IV antibiotics and will consult on patient.   *Exam non-surgical at present  - Colorectal surgery consult  -CLD recommended per surgery, continue to monitor overnight.  - IVF  - Piperacillin-tazobactam IV   - Ondansetron IV/PO, prochlorperazine IV/PO PRN  - Oxycodone PO, hydromorphone IV PRN         Cushing's disease s/p endoscopic transsphenoidal pituitary lesion resection x2 with sellar reconstruction for CSF leak   Recently complicated by glucocortoid withdrawal syndrome and COVID infection requiring increased doses, reports she was advised to double her dose by her endocrinology in acute illness due to relatively high baseline doses.    - Continue IV hydrocortisone to 80 mg AM, 40 mg PM x3 days, then reduce pending clinical course    Hypernatremia  Na improving  Continue LR     Pancreatic cysts  Liver cyst  Splenic lesion  Left renal lesion  Incidentally noted on outpatient CT, see report in chart. Radiology recommending MRI abdomen for follow-up.  - Outpatient follow-up for MRI      Diet: Clear Liquid Diet    DVT Prophylaxis: Pneumatic Compression Devices  Moore Catheter: Not  "present  Lines: None     Cardiac Monitoring: None  Code Status: Full Code          Clinically Significant Risk Factors Present on Admission         # Hypernatremia: Highest Na = 149 mmol/L in last 2 days, will monitor as appropriate                  Disposition Plan     Medically Ready for Discharge:     Disposition:     Interval History   -- chart reviewed  -- CRS notes reviewed  -- had dry heaves overnight and diet placed back to CLD  -- she is feeling better today    -Data reviewed today: I reviewed all new labs and imaging over the last 24 hours. I personally reviewed no images or EKG's today.    Physical Exam    , Blood pressure 135/74, pulse 88, temperature 98.5  F (36.9  C), temperature source Oral, resp. rate 16, height 1.651 m (5' 5\"), weight 61.7 kg (136 lb), SpO2 98%.  Vitals:    09/09/24 1951   Weight: 61.7 kg (136 lb)     Vital Signs with Ranges  Temp:  [97.4  F (36.3  C)-98.9  F (37.2  C)] 98.5  F (36.9  C)  Pulse:  [79-88] 88  Resp:  [16-18] 16  BP: (122-140)/(73-82) 135/74  SpO2:  [94 %-98 %] 98 %  I/O's Last 24 hours  I/O last 3 completed shifts:  In: 2972 [P.O.:720; I.V.:2252]  Out: 1100 [Urine:1000; Emesis/NG output:100]    Constitutional: Awake, alert, cooperative, no apparent distress  Respiratory: Clear to auscultation bilaterally, no crackles or wheezing  Cardiovascular: Regular rate and rhythm, normal S1 and S2, and no murmur noted  GI: + bowel sounds, soft overall, non-distended, + tender on left side  Skin/Integumen: No rashes, no cyanosis, no edema  Other:      Medications   All medications were reviewed.  Current Facility-Administered Medications   Medication Dose Route Frequency Provider Last Rate Last Admin    lactated ringers infusion   Intravenous Continuous Matias Gutierrez  mL/hr at 09/11/24 0717 New Bag at 09/11/24 0717     Current Facility-Administered Medications   Medication Dose Route Frequency Provider Last Rate Last Admin    hydrocortisone sodium succinate PF " (solu-CORTEF) injection 40 mg  40 mg Intravenous Q24H Matias Gutierrez MD   40 mg at 09/11/24 1519    hydrocortisone sodium succinate PF (solu-CORTEF) injection 80 mg  80 mg Intravenous Daily Matias Gutierrez MD   80 mg at 09/11/24 0852    multivitamin w/minerals (THERA-VIT-M) tablet 1 tablet  1 tablet Oral Daily Young Vital MD        piperacillin-tazobactam (ZOSYN) 3.375 g vial to attach to  mL bag  3.375 g Intravenous Q6H Matias Gutierrez MD   3.375 g at 09/11/24 1410    sodium chloride (PF) 0.9% PF flush 3 mL  3 mL Intracatheter Q8H Matias Gutierrez MD   3 mL at 09/11/24 1522        Data   Recent Labs   Lab 09/11/24  0652 09/10/24  0725   WBC 17.0* 9.7   HGB 12.8 11.6*   MCV 93 95    403   * 149*   POTASSIUM 2.7* 3.8   CHLORIDE 109* 111*   CO2 24 26   BUN 5.2* 7.9*   CR 0.66 0.76   ANIONGAP 13 12   SEGUN 8.9 9.2   GLC 86 77       No results found for this or any previous visit (from the past 24 hour(s)).    Young Vital MD  Text Page  (7am to 6pm)

## 2024-09-11 NOTE — PROGRESS NOTES
"CLINICAL NUTRITION SERVICES  -  ASSESSMENT NOTE    RECOMMENDATIONS FOR MD/PROVIDER TO ORDER:   If unable to advance past liquids within the next 48 hours, recommend consideration of nutrition support   Recommendations Ordered by Registered Dietitian (RD):   Ordered berry Ensure Clear between meals  Ordered MVI/M w/ cosign   Malnutrition:   % Weight Loss:  > 5% in 1 month (severe malnutrition)  % Intake:  </= 75% for >/= 1 month (severe malnutrition)  Subcutaneous Fat Loss:  Orbital region moderate depletion and Upper arm region moderate depletion  Muscle Loss:  Temporal region moderate depletion and Clavicle bone region moderate-severe depletion, Upper Arm moderate-severe depletion  Fluid Retention:  None noted    Malnutrition Diagnosis: Severe malnutrition  In Context of:  Acute illness or injury     REASON FOR ASSESSMENT  Dilma Navarro is a 59 year old female seen by Registered Dietitian for Admission Nutrition Risk Screen for answering \"yes\" to recently losing weight without trying (2-13#) and yes to recently eating poorly d/t a decrease in appetite.    PMH of Cushing's disease s/p pituitary resection with adrenal insufficiency. Presents with acute sigmoid diverticulitis with perforation and small to moderate pneumoperitoneum.    NUTRITION HISTORY    - Spoke with patient and  in room. Per , he has been very concerned about patients nutrition for the past couple months. He said she had a procedure towards the end of July and ever since then she has had a poor appetite, mostly due to nausea. He said she has lost ~12# since then. Per patient, she has felt more weak in this span of time. She did say she has been taking vitamins for the past couple months.    CURRENT NUTRITION ORDERS  Diet Order: Clear Liquid     Current Intake/Tolerance:  - Per patient, she has a poor appetite currently d/t nausea and emesis x1 last night. She is willing to try an Ensure clear for po push while on restricted " "diet.    NUTRITION FOCUSED PHYSICAL ASSESSMENT FOR DIAGNOSING MALNUTRITION)  Yes         Observed:    Muscle wasting (refer to documentation in Malnutrition section) and Subcutaneous fat loss (refer to documentation in Malnutrition section)    ANTHROPOMETRICS  Height: 5' 5\"  Weight: 61.7 kg, 136 lbs 0 oz  Body mass index is 22.63 kg/m .  Weight Status:  Normal BMI  IBW: 56.8 kg  % IBW: 109%  Weight History: 11.5% wt loss in ~1 month  Wt Readings from Last 10 Encounters:   09/09/24 61.7 kg (136 lb)   07/26/21 64 kg (141 lb 1.5 oz)   04/22/19 69.2 kg (152 lb 9.6 oz)   09/24/12 65.3 kg (144 lb)   09/18/12 65.3 kg (144 lb)   02/26/12 63.5 kg (140 lb)     Per Care Everywhere:  69.7 kg (153 lb 10.6 oz) 07/31/2024  65.4 kg (144 lb 2.9 oz) 04/17/2024    LABS  Na 146 (H), K 2.7 (L), BUN 5.2 (L)    MEDICATIONS  Lactated Ringers at 100 mL/hr    ASSESSED NUTRITION NEEDS PER APPROVED PRACTICE GUIDELINES:  Dosing Weight 61.7 kg  Estimated Energy Needs: 1734-2011 kcals (25-30 Kcal/Kg)  Justification: maintenance  Estimated Protein Needs: 74-93 grams protein (1.2-1.5 g pro/Kg)  Justification: preservation of lean body mass  Estimated Fluid Needs: 8282-6779 mL (1 mL/Kcal)  Justification: maintenance or per MD    NUTRITION DIAGNOSIS:  Inadequate oral intake related to reduced appetite, nausea as evidenced by family report of poor appetite x2 months, 11.5% wt loss in ~1 month, currently restricted to CLD    NUTRITION INTERVENTIONS  Recommendations / Nutrition Prescription  See above    Implementation  - Nutrition education: discussed importance of meeting energy and protein needs after a period of inadequate intakes and weight loss.  Encouraged patient to order a protein food at each meal, went over foods high in protein, introduced ONS. Also discussed following a low fiber diet with acute diverticulitis dx. Went over low fiber foods.  - Medical Food Supplement - ordered  - Multivitamin/Mineral - ordered w/ cosign    Nutrition " Goals  Diet advancement >FLD within 48 hours vs nutrition support initiation    MONITORING AND EVALUATION:  Progress towards goals will be monitored and evaluated per protocol and Practice Guidelines    Madonna Reed RD, LD  Clinical Dietitian - [facility]

## 2024-09-11 NOTE — PROGRESS NOTES
COLON & RECTAL SURGERY  PROGRESS NOTE    September 11, 2024    SUBJECTIVE:  Feeling worse today, had nausea and dry heaves overnight and one emesis, thinks it is related to the hydrocortisone. Abdominal pain unchanged, only taking tylenol. Having loose watery BMs. AVSS, afebrile. WBC 17 from 9.7. Hgb 12.8 from 11.6. K+ 2.7.     OBJECTIVE:  Temp:  [97.4  F (36.3  C)-98.9  F (37.2  C)] 98.5  F (36.9  C)  Pulse:  [79-88] 88  Resp:  [16-18] 16  BP: (122-140)/(73-82) 135/74  SpO2:  [94 %-98 %] 98 %    Intake/Output Summary (Last 24 hours) at 9/11/2024 0836  Last data filed at 9/11/2024 0644  Gross per 24 hour   Intake 3212 ml   Output 1500 ml   Net 1712 ml       GENERAL:  Awake, alert, no acute distress  HEAD: Normocephalic atraumatic  SCLERA: Anicteric  EXTREMITIES: Warm and well perfused  ABDOMEN:  Soft, non-distended, mildly tender throughout left abdomen. No guarding, rigidity, or peritoneal signs.   LABS:  Lab Results   Component Value Date    WBC 17.0 09/11/2024    WBC 12.5 09/18/2012     Lab Results   Component Value Date    HGB 12.8 09/11/2024    HGB 14.7 09/18/2012     Lab Results   Component Value Date    HCT 38.4 09/11/2024    HCT 43.7 09/18/2012     Lab Results   Component Value Date     09/11/2024     09/18/2012     Last Basic Metabolic Panel:  Lab Results   Component Value Date     09/11/2024     09/18/2012      Lab Results   Component Value Date    POTASSIUM 2.7 09/11/2024    POTASSIUM 3.6 09/18/2012     Lab Results   Component Value Date    CHLORIDE 109 09/11/2024    CHLORIDE 103 09/18/2012     Lab Results   Component Value Date    SEGUN 8.9 09/11/2024    SEGUN 9.8 09/18/2012     Lab Results   Component Value Date    CO2 24 09/11/2024    CO2 23 09/18/2012     Lab Results   Component Value Date    BUN 5.2 09/11/2024    BUN 17 09/18/2012     Lab Results   Component Value Date    CR 0.66 09/11/2024    CR 0.64 09/18/2012     Lab Results   Component Value Date    GLC 86 09/11/2024    GLC  85 09/18/2012       ASSESSMENT/PLAN: 60 yo F with Cushing's disease s/p pituitary adenoma resection about 6 weeks ago with subsequent adrenal insufficiency on hydrocortisone, presented with 2 weeks of LLQ pain and found to have diverticulitis with small to moderate pneumoperitoneum. Hemodynamically stable & afebrile though WBC increased today.    - Ok for clear liquid diet, will stick with ice chips this morning given n/v. NPO if worsening.  - PRN pain/nausea meds  - Continue Zosyn  - Continue IVF  - Trend WBC. If remains elevated or increases again tomorrow and still feeling worse could repeat CT to look for drainable abscess.  - No plans for surgery currently but will continue to monitor. Colonoscopy in 6-8 weeks    Will discuss with Dr. Glover    For questions/paging, please contact the CRS office at [phone].    John Corbin PA-C  Colorectal Physician Assistant    [facility]  [address]  [place] [address]  T: [phone]  F: [phone]        Physician Attestation     I saw and evaluated Dilma Navarro as part of a shared APRN/PA visit.     I personally reviewed the vital signs, medications, labs, and imaging.    I personally provided a substantive portion of care for this patient and I approve the care plan as written by the ANDRZEJ.  I was involved with Medical Decision Making including: Please see A&P for additional details of medical decision making.  Tests REVIEWED in the past 24 hours:  - CBC      WBC up and more tender in LLQ.  Recheck Mary and rescan if WBC continues to rise  Bear Glover MD  Date of Service (when I saw the patient): 09/11/24

## 2024-09-11 NOTE — PLAN OF CARE
Goal Outcome Evaluation: Date & Time: 9/11/24 7a-7p  Dx: Sigmoid divertic w/ perf  Behavior & Aggression: Green  Fall Risk: No  Orientation:A/O x4  ABNL VS/O2:NA  ABNL Labs: Replacement protocol; K+ 2.7 , WBC 17  Pain Management:Tylenol; declines narcotics   Bowel/Bladder: Voiding and having loose BM's  Drains: NA  Wounds/incisions: NA  Diet:Clears, tolerating; mild nausea; Zofran x1  Activity Level: IND  Tests/Procedures: NA  Anticipated  DC Date: Pening clinical progress 1-2 days  Significant Information: LLQ pain well managed with mobility, heat packs and tylenol. Taking steroids course. WBC elevated; potential for CT if WBC continues to rise.        Plan of Care Reviewed With: patient, spouse    Overall Patient Progress: no changeOverall Patient Progress: no change

## 2024-09-11 NOTE — PLAN OF CARE
Goal Outcome Evaluation:      Plan of Care Reviewed With: patient    Overall Patient Progress: improvingOverall Patient Progress: improving       Summary: Here with Acute sigmoid diverticulitis with perforation and small to moderate pneumoperitoneum  Behavior & Aggression: Green  Fall Risk: No  Orientation: A&Ox4  ABNL VS/O2: VSS on RA  ABNL Labs: Potassium protocols- recheck in AM  Pain Management: PRN Tylenol & heat packs  Bowel/Bladder: Voiding well. BS audible- passing gas/ reported a few loose & watery brown stools overnight.   IV: PIV infusing LR at 100 mL/hr with int abx  Diet: Clears. 1x emesis/ dry heaves/ intermittent nausea reported- improved with PRN Zofran x1 & Compazine x1  Activity Level: Independent  Anticipated  DC Date: Pending

## 2024-09-12 LAB
ANION GAP SERPL CALCULATED.3IONS-SCNC: 12 MMOL/L (ref 7–15)
BUN SERPL-MCNC: 4.9 MG/DL (ref 8–23)
CALCIUM SERPL-MCNC: 9.1 MG/DL (ref 8.8–10.4)
CHLORIDE SERPL-SCNC: 114 MMOL/L (ref 98–107)
CREAT SERPL-MCNC: 0.74 MG/DL (ref 0.51–0.95)
EGFRCR SERPLBLD CKD-EPI 2021: >90 ML/MIN/1.73M2
ERYTHROCYTE [DISTWIDTH] IN BLOOD BY AUTOMATED COUNT: 13.2 % (ref 10–15)
GLUCOSE SERPL-MCNC: 102 MG/DL (ref 70–99)
HCO3 SERPL-SCNC: 24 MMOL/L (ref 22–29)
HCT VFR BLD AUTO: 38.6 % (ref 35–47)
HGB BLD-MCNC: 12.4 G/DL (ref 11.7–15.7)
MCH RBC QN AUTO: 30.5 PG (ref 26.5–33)
MCHC RBC AUTO-ENTMCNC: 32.1 G/DL (ref 31.5–36.5)
MCV RBC AUTO: 95 FL (ref 78–100)
PLATELET # BLD AUTO: 435 10E3/UL (ref 150–450)
POTASSIUM SERPL-SCNC: 3.5 MMOL/L (ref 3.4–5.3)
POTASSIUM SERPL-SCNC: 3.6 MMOL/L (ref 3.4–5.3)
RBC # BLD AUTO: 4.07 10E6/UL (ref 3.8–5.2)
SODIUM SERPL-SCNC: 150 MMOL/L (ref 135–145)
WBC # BLD AUTO: 11 10E3/UL (ref 4–11)

## 2024-09-12 PROCEDURE — 80048 BASIC METABOLIC PNL TOTAL CA: CPT | Performed by: INTERNAL MEDICINE

## 2024-09-12 PROCEDURE — 82374 ASSAY BLOOD CARBON DIOXIDE: CPT | Performed by: INTERNAL MEDICINE

## 2024-09-12 PROCEDURE — 250N000013 HC RX MED GY IP 250 OP 250 PS 637: Performed by: INTERNAL MEDICINE

## 2024-09-12 PROCEDURE — 82565 ASSAY OF CREATININE: CPT | Performed by: INTERNAL MEDICINE

## 2024-09-12 PROCEDURE — 36415 COLL VENOUS BLD VENIPUNCTURE: CPT | Performed by: INTERNAL MEDICINE

## 2024-09-12 PROCEDURE — 99232 SBSQ HOSP IP/OBS MODERATE 35: CPT | Performed by: INTERNAL MEDICINE

## 2024-09-12 PROCEDURE — 99207 PR APP CREDIT; MD BILLING SHARED VISIT: CPT | Mod: FS | Performed by: PHYSICIAN ASSISTANT

## 2024-09-12 PROCEDURE — 99232 SBSQ HOSP IP/OBS MODERATE 35: CPT | Mod: FS | Performed by: COLON & RECTAL SURGERY

## 2024-09-12 PROCEDURE — 250N000011 HC RX IP 250 OP 636: Performed by: INTERNAL MEDICINE

## 2024-09-12 PROCEDURE — 85027 COMPLETE CBC AUTOMATED: CPT | Performed by: INTERNAL MEDICINE

## 2024-09-12 PROCEDURE — 258N000003 HC RX IP 258 OP 636: Performed by: INTERNAL MEDICINE

## 2024-09-12 PROCEDURE — 120N000001 HC R&B MED SURG/OB

## 2024-09-12 RX ADMIN — PIPERACILLIN AND TAZOBACTAM 3.38 G: 3; .375 INJECTION, POWDER, FOR SOLUTION INTRAVENOUS at 15:41

## 2024-09-12 RX ADMIN — Medication 1 TABLET: at 10:20

## 2024-09-12 RX ADMIN — HYDROCORTISONE SODIUM SUCCINATE 80 MG: 100 INJECTION, POWDER, FOR SOLUTION INTRAMUSCULAR; INTRAVENOUS at 10:13

## 2024-09-12 RX ADMIN — PIPERACILLIN AND TAZOBACTAM 3.38 G: 3; .375 INJECTION, POWDER, FOR SOLUTION INTRAVENOUS at 02:12

## 2024-09-12 RX ADMIN — PIPERACILLIN AND TAZOBACTAM 3.38 G: 3; .375 INJECTION, POWDER, FOR SOLUTION INTRAVENOUS at 10:21

## 2024-09-12 RX ADMIN — SODIUM CHLORIDE, POTASSIUM CHLORIDE, SODIUM LACTATE AND CALCIUM CHLORIDE: 600; 310; 30; 20 INJECTION, SOLUTION INTRAVENOUS at 05:36

## 2024-09-12 RX ADMIN — PIPERACILLIN AND TAZOBACTAM 3.38 G: 3; .375 INJECTION, POWDER, FOR SOLUTION INTRAVENOUS at 22:57

## 2024-09-12 RX ADMIN — SODIUM CHLORIDE, POTASSIUM CHLORIDE, SODIUM LACTATE AND CALCIUM CHLORIDE: 600; 310; 30; 20 INJECTION, SOLUTION INTRAVENOUS at 16:23

## 2024-09-12 RX ADMIN — HYDROCORTISONE SODIUM SUCCINATE 40 MG: 100 INJECTION, POWDER, FOR SOLUTION INTRAMUSCULAR; INTRAVENOUS at 15:34

## 2024-09-12 ASSESSMENT — ACTIVITIES OF DAILY LIVING (ADL)
ADLS_ACUITY_SCORE: 23

## 2024-09-12 NOTE — PLAN OF CARE
Goal Outcome Evaluation:      Plan of Care Reviewed With: patient    Overall Patient Progress: improvingOverall Patient Progress: improving       Date & Time: 9/12/24 7p-7a  Dx: Sigmoid divertic w/ perf  Behavior & Aggression: Green  Fall Risk: No  Orientation:A/O x4  ABNL VS/O2: VSS on RA   ABNL Labs: Potassium protocols- recheck in AM  Pain Management: declines pain medication, managing with heat packs  Bowel/Bladder: Voiding and having loose BM x3  Drains: NA  Wounds/incisions: NA  Diet: Tolerating clears, denies nausea    Activity Level: IND  Tests/Procedures: NA  Anticipated  DC Date: Pening clinical progress 1-2 days  Significant Information: LLQ pain well managed with mobility and heat packs. WBC elevated; potential for repeat CT if WBC continues to rise.

## 2024-09-12 NOTE — PROGRESS NOTES
COLON & RECTAL SURGERY  PROGRESS NOTE    September 12, 2024    SUBJECTIVE:  Abd pain mostly unchanged. Nausea improved. Having loose BMs. WBC back down to 11 from 17. AVSS.     OBJECTIVE:  Temp:  [98.4  F (36.9  C)-98.5  F (36.9  C)] 98.5  F (36.9  C)  Pulse:  [69-76] 69  Resp:  [16-18] 18  BP: (111-134)/(69-89) 134/89  SpO2:  [95 %-100 %] 100 %    Intake/Output Summary (Last 24 hours) at 9/12/2024 0946  Last data filed at 9/11/2024 2029  Gross per 24 hour   Intake 960 ml   Output 150 ml   Net 810 ml       GENERAL:  Awake, alert, no acute distress  HEAD: Normocephalic atraumatic  SCLERA: Anicteric  EXTREMITIES: Warm and well perfused  ABDOMEN:  Soft, mildly tender in LLQ, non-distended. No guarding, rigidity, or peritoneal signs.     LABS:  Lab Results   Component Value Date    WBC 11.0 09/12/2024    WBC 12.5 09/18/2012     Lab Results   Component Value Date    HGB 12.4 09/12/2024    HGB 14.7 09/18/2012     Lab Results   Component Value Date    HCT 38.6 09/12/2024    HCT 43.7 09/18/2012     Lab Results   Component Value Date     09/12/2024     09/18/2012     Last Basic Metabolic Panel:  Lab Results   Component Value Date     09/12/2024     09/18/2012      Lab Results   Component Value Date    POTASSIUM 3.5 09/12/2024    POTASSIUM 3.6 09/18/2012     Lab Results   Component Value Date    CHLORIDE 114 09/12/2024    CHLORIDE 103 09/18/2012     Lab Results   Component Value Date    SEGUN 9.1 09/12/2024    SEGUN 9.8 09/18/2012     Lab Results   Component Value Date    CO2 24 09/12/2024    CO2 23 09/18/2012     Lab Results   Component Value Date    BUN 4.9 09/12/2024    BUN 17 09/18/2012     Lab Results   Component Value Date    CR 0.74 09/12/2024    CR 0.64 09/18/2012     Lab Results   Component Value Date     09/12/2024    GLC 85 09/18/2012       ASSESSMENT/PLAN: 60 yo F with Cushing's disease s/p pituitary adenoma resection about 6 weeks ago with subsequent adrenal insufficiency on  hydrocortisone, presented with 2 weeks of LLQ pain and found to have diverticulitis with small to moderate pneumoperitoneum. WBC improved today, exam stable.     - Full liquid diet  - PRN pain/nausea meds  - Continue Zosyn  - Will hold off on repeat CT given improvement in WBC today, low threshold to repeat though if symptoms or exam are worsening. Monitor WBC.   - No plans for surgery currently. Colonoscopy in 6-8 weeks.    Discussed with Dr. Marcelo.    For questions/paging, please contact the CRS office at [phone].    John Corbin PA-C  Colorectal Physician Assistant    [facility]  [address]  [place] [address]  T:   F:       [facility]  I performed a history and physical examination of the patient and discussed their management with the physician assistant. I reviewed the physician assistants note and agree with the documented findings and plan of care.         Physician Attestation     I saw and evaluated Dilma Navarro as part of a shared APRN/PA visit.     I personally reviewed the vital signs, medications, labs, and imaging.    I personally provided a substantive portion of care for this patient and I approve the care plan as written by the ANDRZEJ.  I was involved with Medical Decision Making includin MINUTES SPENT BY ME on the date of service doing chart review, history, exam, documentation & further activities per the note.    Stable pain. White count normalized.  No fevers.    Alert, NAD  Abd soft, mild LLQ ttp no rebound or guarding    Plan:   Avd to fulls  Cont IV ABx      Alicia Marcelo MD  Date of Service (when I saw the patient): 24     Carie Marcelo MD, FACS, FASCRS    [facility]  [address]  [place] [address]  T:   F:

## 2024-09-12 NOTE — PROGRESS NOTES
[facility]  Hospitalist Progress Note  Young Vital MD  09/12/2024    Assessment & Plan   Dilma Navarro is a 59 year old female with past medical history significant for Cushing's disease s/p pituitary resection with adrenal insufficiency who presents with abdominal pain with outpatient CT demonstrating perforated diverticulitis. Admitted on 9/9/2024.      Acute sigmoid diverticulitis with perforation and small to moderate pneumoperitoneum   *Presents with left-lower quadrant pain   *WBC 15.4 (outside lab). Afebrile. Lactic acid normal. Exam non-surgical at this time.   *CT abd/pelvis (outside CT) with sigmoid diverticulitis with perforation and small to moderate volume pneumoperitoneum without abscess. Primary care provider had already discussed with CRS (Dr. Glover) who recommended admission for IV antibiotics and will consult on patient.   *Exam non-surgical at present  - Colorectal surgery following  -CLD advanced to full liquids  - SL IVF  - Piperacillin-tazobactam IV   - Ondansetron IV/PO, prochlorperazine IV/PO PRN  - Oxycodone PO, hydromorphone IV PRN         Cushing's disease s/p endoscopic transsphenoidal pituitary lesion resection x2 with sellar reconstruction for CSF leak   Recently complicated by glucocortoid withdrawal syndrome and COVID infection requiring increased doses, reports she was advised to double her dose by her endocrinology in acute illness due to relatively high baseline doses.    - Continue IV hydrocortisone to 80 mg AM, 40 mg PM x3 days, then reduce pending clinical course  - decrease the dose to 40 / 20 mg     Hypernatremia  Na remains elevated  Getting frequent Zosyn with Na load  Discontinue IVF     Pancreatic cysts  Liver cyst  Splenic lesion  Left renal lesion  Incidentally noted on outpatient CT, see report in chart. Radiology recommending MRI abdomen for follow-up.  - Outpatient follow-up for MRI      Diet: FLD  DVT Prophylaxis: Pneumatic  "Compression Devices  Moore Catheter: Not present  Lines: None     Cardiac Monitoring: None  Code Status: Full Code          Clinically Significant Risk Factors Present on Admission         # Hypernatremia: Highest Na = 149 mmol/L in last 2 days, will monitor as appropriate                  Disposition Plan     Medically Ready for Discharge:   -- in 1-2 days    Disposition:     Interval History   -- discussed with RN about hydrocortisone dosage decrease  --  she is feeling better and taking in more PO    -Data reviewed today: I reviewed all new labs and imaging over the last 24 hours. I personally reviewed no images or EKG's today.    Physical Exam    , Blood pressure 119/79, pulse 79, temperature 98.4  F (36.9  C), temperature source Oral, resp. rate 16, height 1.651 m (5' 5\"), weight 61.7 kg (136 lb), SpO2 98%.  Vitals:    09/09/24 1951   Weight: 61.7 kg (136 lb)     Vital Signs with Ranges  Temp:  [98.4  F (36.9  C)-98.5  F (36.9  C)] 98.4  F (36.9  C)  Pulse:  [69-79] 79  Resp:  [16-18] 16  BP: (119-134)/(73-89) 119/79  SpO2:  [95 %-100 %] 98 %  I/O's Last 24 hours  I/O last 3 completed shifts:  In: 720 [P.O.:720]  Out: 150 [Urine:150]    Constitutional: Awake, alert, cooperative, no apparent distress  Respiratory: Clear to auscultation bilaterally, no crackles or wheezing  Cardiovascular: Regular rate and rhythm, normal S1 and S2, and no murmur noted  GI: + bowel sounds, soft overall, non-distended, + tender on left side  Skin/Integumen: No rashes, no cyanosis, no edema  Other:      Medications   All medications were reviewed.  Current Facility-Administered Medications   Medication Dose Route Frequency Provider Last Rate Last Admin     Current Facility-Administered Medications   Medication Dose Route Frequency Provider Last Rate Last Admin    [START ON 9/13/2024] hydrocortisone sodium succinate PF (solu-CORTEF) injection 20 mg  20 mg Intravenous Q24H Young Vital MD        [START ON 9/13/2024] " hydrocortisone sodium succinate PF (solu-CORTEF) injection 40 mg  40 mg Intravenous Daily Young Vital MD        multivitamin w/minerals (THERA-VIT-M) tablet 1 tablet  1 tablet Oral Daily Young Vital MD   1 tablet at 09/12/24 1020    piperacillin-tazobactam (ZOSYN) 3.375 g vial to attach to  mL bag  3.375 g Intravenous Q6H Matias Gutierrez  mL/hr at 09/12/24 1541 3.375 g at 09/12/24 1541    sodium chloride (PF) 0.9% PF flush 3 mL  3 mL Intracatheter Q8H Matias Gutierrez MD   3 mL at 09/11/24 1522        Data   Recent Labs   Lab 09/12/24  0708 09/12/24  0139 09/11/24  0652 09/10/24  0725   WBC 11.0  --  17.0* 9.7   HGB 12.4  --  12.8 11.6*   MCV 95  --  93 95     --  397 403   *  --  146* 149*   POTASSIUM 3.5 3.6 2.7* 3.8   CHLORIDE 114*  --  109* 111*   CO2 24  --  24 26   BUN 4.9*  --  5.2* 7.9*   CR 0.74  --  0.66 0.76   ANIONGAP 12  --  13 12   SEGUN 9.1  --  8.9 9.2   *  --  86 77       No results found for this or any previous visit (from the past 24 hour(s)).    Young Vital MD  Text Page  (7am to 6pm)

## 2024-09-12 NOTE — PLAN OF CARE
Goal Outcome Evaluation:    Denies CP, SOB. All pt needs met at this time. Full liquid diet, pt reports no nausea and no increased abd pain.

## 2024-09-13 VITALS
SYSTOLIC BLOOD PRESSURE: 127 MMHG | WEIGHT: 136 LBS | OXYGEN SATURATION: 97 % | TEMPERATURE: 97.4 F | HEIGHT: 65 IN | DIASTOLIC BLOOD PRESSURE: 80 MMHG | HEART RATE: 57 BPM | RESPIRATION RATE: 16 BRPM | BODY MASS INDEX: 22.66 KG/M2

## 2024-09-13 LAB
ANION GAP SERPL CALCULATED.3IONS-SCNC: 11 MMOL/L (ref 7–15)
BUN SERPL-MCNC: 6.4 MG/DL (ref 8–23)
CALCIUM SERPL-MCNC: 9.3 MG/DL (ref 8.8–10.4)
CHLORIDE SERPL-SCNC: 108 MMOL/L (ref 98–107)
CREAT SERPL-MCNC: 0.7 MG/DL (ref 0.51–0.95)
EGFRCR SERPLBLD CKD-EPI 2021: >90 ML/MIN/1.73M2
GLUCOSE SERPL-MCNC: 110 MG/DL (ref 70–99)
HCO3 SERPL-SCNC: 26 MMOL/L (ref 22–29)
POTASSIUM SERPL-SCNC: 2.8 MMOL/L (ref 3.4–5.3)
POTASSIUM SERPL-SCNC: 3.8 MMOL/L (ref 3.4–5.3)
SODIUM SERPL-SCNC: 145 MMOL/L (ref 135–145)

## 2024-09-13 PROCEDURE — 250N000013 HC RX MED GY IP 250 OP 250 PS 637: Performed by: INTERNAL MEDICINE

## 2024-09-13 PROCEDURE — 250N000011 HC RX IP 250 OP 636: Performed by: INTERNAL MEDICINE

## 2024-09-13 PROCEDURE — 84132 ASSAY OF SERUM POTASSIUM: CPT | Performed by: INTERNAL MEDICINE

## 2024-09-13 PROCEDURE — 36415 COLL VENOUS BLD VENIPUNCTURE: CPT | Performed by: INTERNAL MEDICINE

## 2024-09-13 PROCEDURE — 80048 BASIC METABOLIC PNL TOTAL CA: CPT | Performed by: INTERNAL MEDICINE

## 2024-09-13 PROCEDURE — 99239 HOSP IP/OBS DSCHRG MGMT >30: CPT | Performed by: INTERNAL MEDICINE

## 2024-09-13 PROCEDURE — 99231 SBSQ HOSP IP/OBS SF/LOW 25: CPT | Performed by: PHYSICIAN ASSISTANT

## 2024-09-13 RX ORDER — POTASSIUM CHLORIDE 1500 MG/1
40 TABLET, EXTENDED RELEASE ORAL ONCE
Status: COMPLETED | OUTPATIENT
Start: 2024-09-13 | End: 2024-09-13

## 2024-09-13 RX ORDER — POTASSIUM CHLORIDE 1500 MG/1
20 TABLET, EXTENDED RELEASE ORAL ONCE
Status: COMPLETED | OUTPATIENT
Start: 2024-09-13 | End: 2024-09-13

## 2024-09-13 RX ORDER — METRONIDAZOLE 500 MG/1
500 TABLET ORAL 3 TIMES DAILY
Qty: 30 TABLET | Refills: 0 | Status: SHIPPED | OUTPATIENT
Start: 2024-09-13 | End: 2024-09-23

## 2024-09-13 RX ORDER — CIPROFLOXACIN 500 MG/1
500 TABLET, FILM COATED ORAL 2 TIMES DAILY
Qty: 14 TABLET | Refills: 0 | Status: SHIPPED | OUTPATIENT
Start: 2024-09-13 | End: 2024-09-23

## 2024-09-13 RX ADMIN — PIPERACILLIN AND TAZOBACTAM 3.38 G: 3; .375 INJECTION, POWDER, FOR SOLUTION INTRAVENOUS at 10:55

## 2024-09-13 RX ADMIN — POTASSIUM CHLORIDE 20 MEQ: 1500 TABLET, EXTENDED RELEASE ORAL at 10:55

## 2024-09-13 RX ADMIN — Medication 1 TABLET: at 09:09

## 2024-09-13 RX ADMIN — HYDROCORTISONE SODIUM SUCCINATE 40 MG: 100 INJECTION, POWDER, FOR SOLUTION INTRAMUSCULAR; INTRAVENOUS at 09:09

## 2024-09-13 RX ADMIN — POTASSIUM CHLORIDE 40 MEQ: 1500 TABLET, EXTENDED RELEASE ORAL at 09:09

## 2024-09-13 RX ADMIN — PIPERACILLIN AND TAZOBACTAM 3.38 G: 3; .375 INJECTION, POWDER, FOR SOLUTION INTRAVENOUS at 05:20

## 2024-09-13 ASSESSMENT — ACTIVITIES OF DAILY LIVING (ADL)
ADLS_ACUITY_SCORE: 23

## 2024-09-13 NOTE — PLAN OF CARE
Date & Time: 9/13/24, 4423-3326  Surgery/POD#: Here with Acute sigmoid diverticulitis with perforation and small to moderate pneumoperitoneum.  Behavior & Aggression: Green  Fall Risk: No  Orientation:A&Ox4  ABNL VS/O2:VSS on RA  ABNL Labs: see chart  Pain Management:Denies   Bowel/Bladder: Continent  Drains: PIV SL   Wounds/incisions: Na  Diet:FLD  Activity Level: Independent  Tests/Procedures: na  Anticipated  DC Date: Possibly today  Significant Information: Plan to discharge today. AVS reviewed. Education provided, understanding verbalized. Discharged now to home

## 2024-09-13 NOTE — PROVIDER NOTIFICATION
MD Notification    Notified Person: MD    Notified Person Name:Dr. Vital    Notification Date/Time:9/13/24, 1442    Notification Interaction:Sabino    Purpose of Notification:Pt has no IV site, is the last dose of steroid still required. Please advice.     Orders Received:    Comments:

## 2024-09-13 NOTE — PROVIDER NOTIFICATION
MD Notification    Notified Person: MD    Notified Person Name:Dr. Vital    Notification Date/Time:9/13/24, 1523    Notification Interaction:Vocera    Purpose of Notification:Pt's  frustrated; states they are ready to leave. Can discharge Pt order be placed? thanks     Orders Received:    Comments:

## 2024-09-13 NOTE — PLAN OF CARE
Goal Outcome Evaluation:      Plan of Care Reviewed With: patient    Overall Patient Progress: improvingOverall Patient Progress: improving  Dx: Sigmoid divertic w/ perf  Behavior & Aggression: Green  Fall Risk: No  Orientation:A/O x4  ABNL VS/O2: VSS on RA   ABNL Labs: Potassium protocols- recheck in AM  Pain Management: declines pain medication, managing with heat packs  Bowel/Bladder: Voiding and having loose BM x3  Drains: NA  Wounds/incisions: NA  Diet: Full liquid   Activity Level: IND  Tests/Procedures: NA  Anticipated  DC Date: Today pending clinical progress   Significant Information:

## 2024-09-13 NOTE — DISCHARGE SUMMARY
[facility]  Hospitalist Discharge Summary      Date of Admission:  9/9/2024  Date of Discharge:  9/13/2024  Discharging Provider: Young Vital MD  Discharge Service: Hospitalist Service    Discharge Diagnoses   Dilma Navarro is a 59 year old female with past medical history significant for Cushing's disease s/p pituitary resection with adrenal insufficiency who presents with abdominal pain with outpatient CT demonstrating perforated diverticulitis. Admitted on 9/9/2024.      Acute sigmoid diverticulitis with perforation and small to moderate pneumoperitoneum            Cushing's disease s/p endoscopic transsphenoidal pituitary lesion resection x2 with sellar reconstruction for CSF leak         Hypernatremia    Hypokalemia     Pancreatic cysts  Liver cyst  Splenic lesion  Left renal lesion      Clinically Significant Risk Factors Present on Admission         # Hypernatremia: Highest Na = 149 mmol/L in last 2 days, will monitor as appropriate                   Disposition Plan     Medically Ready for Discharge:   -- to home today     Disposition:     Clinically Significant Risk Factors     # Severe Malnutrition: based on nutrition assessment      Follow-ups Needed After Discharge   Follow-up Appointments     Follow-up and recommended labs and tests       Colonoscopy with Dr. Glover in 6-8 weeks, our office will call you to   schedule. Call [facility] with questions/concerns   at [phone].        Follow-up and recommended labs and tests       Follow up with CRS as directed        {Additional follow-up instructions/to-do's for PCP and CRS    Unresulted Labs Ordered in the Past 30 Days of this Admission       No orders found from 8/10/2024 to 9/10/2024.        These results will be followed up by CRS and PCP    Discharge Disposition   Discharged to home  Condition at discharge: Stable    Hospital Course   Dilma Navarro is a 59 year old female  with past medical history significant for Cushing's disease s/p pituitary resection with adrenal insufficiency who presents with abdominal pain with outpatient CT demonstrating perforated diverticulitis. Admitted on 9/9/2024.      Acute sigmoid diverticulitis with perforation and small to moderate pneumoperitoneum   *Presents with left-lower quadrant pain   *WBC 15.4 (outside lab). Afebrile. Lactic acid normal. Exam non-surgical at this time.   *CT abd/pelvis (outside CT) with sigmoid diverticulitis with perforation and small to moderate volume pneumoperitoneum without abscess. Primary care provider had already discussed with CRS (Dr. Glover) who recommended admission for IV antibiotics and will consult on patient.   *Exam non-surgical at present  - Colorectal surgery following  -CLD advanced to full liquids  - SL IVF  - Piperacillin-tazobactam IV to Cipro and flagyl to complete 14 day course  - Ondansetron IV/PO, prochlorperazine IV/PO PRN  - Oxycodone PO, hydromorphone IV PRN         Cushing's disease s/p endoscopic transsphenoidal pituitary lesion resection x2 with sellar reconstruction for CSF leak   Recently complicated by glucocortoid withdrawal syndrome and COVID infection requiring increased doses, reports she was advised to double her dose by her endocrinology in acute illness due to relatively high baseline doses.    - Continue IV hydrocortisone to 80 mg AM, 40 mg PM x3 days, then reduce pending clinical course  - decrease the dose to 40 / 20 mg     Hypernatremia  Na remains elevated  Getting frequent Zosyn with Na load  Discontinue IVF  Na back to normal range at 145    Hypokalemia  K of 2.8  Was treated by being given K supplementation and discharge K is 3.8     Pancreatic cysts  Liver cyst  Splenic lesion  Left renal lesion  Incidentally noted on outpatient CT, see report in chart. Radiology recommending MRI abdomen for follow-up.  - Outpatient follow-up for MRI      Severe Protein-Calorie  "Malnutrition  % Weight Loss:  > 5% in 1 month (severe malnutrition)  % Intake:  </= 75% for >/= 1 month (severe malnutrition)  Subcutaneous Fat Loss:  Orbital region moderate depletion and Upper arm region moderate depletion  Muscle Loss:  Temporal region moderate depletion and Clavicle bone region moderate-severe depletion, Upper Arm moderate-severe depletion  Fluid Retention:  None noted  Malnutrition Diagnosis: Severe malnutrition  In Context of:  Acute illness or injury\"  Tx:   --RD PN K.Haus: \"If unable to advance past liquids within the next 48 hours, recommend consideration of nutrition support.Ordered berry Ensure Clear between meals  Ordered MVI/M w/ cosign\"    Diet: FLD to low residue diet  DVT Prophylaxis: Pneumatic Compression Devices  Moore Catheter: Not present  Lines: None     Cardiac Monitoring: None  Code Status: Full Code          Clinically Significant Risk Factors Present on Admission         # Hypernatremia: Highest Na = 149 mmol/L in last 2 days, will monitor as appropriate                  Disposition Plan     Medically Ready for Discharge:   -- in 1-2 days    Disposition:     Consultations This Hospital Stay   COLORECTAL SURGERY IP CONSULT    Code Status   Full Code    Time Spent on this Encounter   I, Young Vital MD, personally saw the patient today and spent greater than 30 minutes discharging this patient.       Young Vital MD  [address] [address]  Phone: [phone]  Fax: [phone]  ______________________________________________________________________    Physical Exam   Vital Signs: Temp: 97.4  F (36.3  C) Temp src: Oral BP: 127/80 Pulse: 57   Resp: 16 SpO2: 97 % O2 Device: None (Room air)    Weight: 136 lbs 0 oz         Primary Care Physician   Valentino Thompson    Discharge Orders      Follow-up and recommended labs and tests     Colonoscopy with Dr. Glover in 6-8 weeks, our office will call you to " schedule. Call [facility] with questions/concerns at [phone].     Reason for your hospital stay    Admit with sigmoid diverticulitis     Follow-up and recommended labs and tests     Follow up with CRS as directed     Activity    Your activity upon discharge: activity as tolerated     Diet    Follow this diet upon discharge: Current Diet:Orders Placed This Encounter      Snacks/Supplements Adult: Ensure Clear; Between Meals      Full Liquid Diet to low residue diet as tolerated       Significant Results and Procedures   Most Recent 3 CBC's:  Recent Labs   Lab Test 09/12/24  0708 09/11/24  0652 09/10/24  0725   WBC 11.0 17.0* 9.7   HGB 12.4 12.8 11.6*   MCV 95 93 95    397 403     Most Recent 3 BMP's:  Recent Labs   Lab Test 09/13/24  0654 09/12/24  0708 09/12/24  0139 09/11/24  0652    150*  --  146*   POTASSIUM 2.8* 3.5 3.6 2.7*   CHLORIDE 108* 114*  --  109*   CO2 26 24  --  24   BUN 6.4* 4.9*  --  5.2*   CR 0.70 0.74  --  0.66   ANIONGAP 11 12  --  13   SEGUN 9.3 9.1  --  8.9   * 102*  --  86   ,   Results for orders placed or performed during the hospital encounter of 12/20/12   US Venous lower extremity lt    Narrative       EXAMINATION: Left lower extremity venous Doppler ultrasound.     COMPARISON: 11/5/2012     HISTORY: History of left peroneal and posterior tibial thrombus.     FINDINGS:  The left common femoral, femoral, popliteal and posterior tibial  veins are fully compressible with patent Doppler wave forms. No  thrombus is identified within them on grayscale imaging. The left  peroneal veins are compressible in the upper and lower calf, but are  noncompressible in the mid calf. These demonstrate intraluminal  hypoechoic thrombus on grayscale imaging.     The common femoral venous waveforms on both sides are symmetric and  phasic.       Impression    IMPRESSION: Persistent occlusive thrombus in the left peroneal veins  in the midcalf.     MAE MCKINNON MD  I  have personally reviewed the image and initial interpretation and  agree with the findings.       Discharge Medications   Current Discharge Medication List        START taking these medications    Details   !! amoxicillin-clavulanate (AUGMENTIN) 875-125 MG tablet Take 1 tablet by mouth 2 times daily for 11 days.  Qty: 22 tablet, Refills: 0    Associated Diagnoses: Diverticulitis of colon with perforation      ciprofloxacin (CIPRO) 500 MG tablet Take 1 tablet (500 mg) by mouth 2 times daily for 10 days.  Qty: 14 tablet, Refills: 0    Associated Diagnoses: Diverticulitis of colon with perforation      metroNIDAZOLE (FLAGYL) 500 MG tablet Take 1 tablet (500 mg) by mouth 3 times daily for 10 days.  Qty: 30 tablet, Refills: 0    Associated Diagnoses: Diverticulitis of colon with perforation       !! - Potential duplicate medications found. Please discuss with provider.        CONTINUE these medications which have NOT CHANGED    Details   !! amoxicillin-clavulanate (AUGMENTIN) 875-125 MG tablet Take 1 tablet by mouth 2 times daily.      Cholecalciferol (VITAMIN D-3) 5000 UNITS TABS Take 5,000 Units by mouth daily.      CHROMIUM PO Take 2 capsules by mouth daily. (Formulation and dose unknown)      dexAMETHasone (DECADRON) 4 mg/mL Inject 1 mL into the muscle daily as needed. For emergency use when unable to take oral steroids      fish oil-omega-3 fatty acids 1000 MG capsule Take 2 g by mouth daily.      hydrocortisone (CORTEF) 10 MG tablet Take 4 tablets (40 mg) by mouth in the morning and 2 tablets (20 mg) by mouth in the afternoon (2 pm) x 7 days then may decrease to 30 mg in the AM and 20 mg in the afternoon if able. If unable to decrease dose resume original dosing and follow up in 2-3 weeks.      LACTOBACILLUS PROBIOTIC PO Take 1 capsule by mouth daily.      Magnesium 500 MG CAPS Take 2 capsules by mouth daily. (Exact formulation not known)      Vitamin A 7.5 MG (30145 UT) CAPS Take 25,000 Units by mouth daily.       Zinc Sulfate 220 (50 Zn) MG capsule Take 220 mg by mouth daily. (50 mg elemental Zinc)       !! - Potential duplicate medications found. Please discuss with provider.        Allergies   Allergies   Allergen Reactions    No Known Drug Allergy

## 2024-09-13 NOTE — PROGRESS NOTES
COLON & RECTAL SURGERY  PROGRESS NOTE    September 13, 2024    SUBJECTIVE:  Doing well. Denies abdominal pain. No n/v, tolerating FLD. Passing gas and having loose BMs. AVSS.     OBJECTIVE:  Temp:  [97.2  F (36.2  C)-98.4  F (36.9  C)] 97.2  F (36.2  C)  Pulse:  [64-79] 64  Resp:  [16] 16  BP: (119-139)/(78-79) 139/78  SpO2:  [93 %-98 %] 93 %    Intake/Output Summary (Last 24 hours) at 9/13/2024 0721  Last data filed at 9/12/2024 1711  Gross per 24 hour   Intake 1320 ml   Output --   Net 1320 ml       GENERAL:  Awake, alert, no acute distress  HEAD: Normocephalic atraumatic  SCLERA: Anicteric  EXTREMITIES: Warm and well perfused  ABDOMEN:  Soft, minimally tender, non-distended. No guarding, rigidity, or peritoneal signs.     LABS:  Lab Results   Component Value Date    WBC 11.0 09/12/2024    WBC 12.5 09/18/2012     Lab Results   Component Value Date    HGB 12.4 09/12/2024    HGB 14.7 09/18/2012     Lab Results   Component Value Date    HCT 38.6 09/12/2024    HCT 43.7 09/18/2012     Lab Results   Component Value Date     09/12/2024     09/18/2012     Last Basic Metabolic Panel:  Lab Results   Component Value Date     09/12/2024     09/18/2012      Lab Results   Component Value Date    POTASSIUM 3.5 09/12/2024    POTASSIUM 3.6 09/18/2012     Lab Results   Component Value Date    CHLORIDE 114 09/12/2024    CHLORIDE 103 09/18/2012     Lab Results   Component Value Date    SEGUN 9.1 09/12/2024    SEGUN 9.8 09/18/2012     Lab Results   Component Value Date    CO2 24 09/12/2024    CO2 23 09/18/2012     Lab Results   Component Value Date    BUN 4.9 09/12/2024    BUN 17 09/18/2012     Lab Results   Component Value Date    CR 0.74 09/12/2024    CR 0.64 09/18/2012     Lab Results   Component Value Date     09/12/2024    GLC 85 09/18/2012       ASSESSMENT/PLAN: 60 yo F with Cushing's disease s/p pituitary adenoma resection about 6 weeks ago with subsequent adrenal insufficiency on hydrocortisone,  presented with 2 weeks of LLQ pain and found to have diverticulitis with small to moderate pneumoperitoneum. Improving with conservative management.     - Full liquid diet today, can transition to low fiber tomorrow at home  - Transition to PO Abx at discharge to complete 14 day course  - PRN pain meds  - Ok to discharge today from our standpoint. Discussed return precautions.   - Our office will arrange a colonoscopy in 6-8 weeks    Discussed with Dr. Glover.    For questions/paging, please contact the CRS office at [phone].    John Corbin PA-C  Colorectal Physician Assistant    [facility]  [address]  [place] [address]  T: [phone]  F: [phone]

## 2024-09-13 NOTE — PROVIDER NOTIFICATION
MD Notification    Notified Person: MD    Notified Person Name:Dr. Vital    Notification Date/Time:9/13/24, 1109    Notification Interaction:Vocera message    Purpose of Notification:Per CRS, okay to discharge home. Pt  would like to know when they can discharge. Please advise.    Orders Received:    Comments:

## 2024-09-15 ENCOUNTER — PATIENT OUTREACH (OUTPATIENT)
Dept: CARE COORDINATION | Facility: CLINIC | Age: 59
End: 2024-09-15
Payer: COMMERCIAL

## 2024-09-19 ENCOUNTER — MEDICAL CORRESPONDENCE (OUTPATIENT)
Dept: HEALTH INFORMATION MANAGEMENT | Facility: CLINIC | Age: 59
End: 2024-09-19
Payer: COMMERCIAL

## 2024-09-19 ENCOUNTER — TRANSCRIBE ORDERS (OUTPATIENT)
Dept: OTHER | Age: 59
End: 2024-09-19

## 2024-09-19 DIAGNOSIS — Z12.11 ENCOUNTER FOR SCREENING COLONOSCOPY: Primary | ICD-10-CM

## 2024-09-25 ENCOUNTER — TELEPHONE (OUTPATIENT)
Dept: GASTROENTEROLOGY | Facility: CLINIC | Age: 59
End: 2024-09-25
Payer: COMMERCIAL

## 2024-09-25 NOTE — TELEPHONE ENCOUNTER
"Patient requested for IV for hydrocortisone prior to procedure.    Endoscopy Scheduling Screen    Have you had any respiratory illness or flu-like symptoms in the last 10 days?  No    What is your communication preference for Instructions and/or Bowel Prep?   Mail/USPS    What insurance is in the chart?  Other:  BCBS    Ordering/Referring Provider: Kera Phillips MD in GENERIC EXTERNAL DATA DEPARTMENT     (If ordering provider performs procedure, schedule with ordering provider unless otherwise instructed. )    BMI: Estimated body mass index is 22.63 kg/m  as calculated from the following:    Height as of 9/9/24: 1.651 m (5' 5\").    Weight as of 9/9/24: 61.7 kg (136 lb).     Sedation Ordered  moderate sedation.   If patient BMI > 50 do not schedule in ASC.    If patient BMI > 45 do not schedule at [facility].    Are you taking methadone or Suboxone?  NO, No RN review required.    Have you been diagnosed and are being treated for severe PTSD or severe anxiety?  NO, No RN review required.    Are you taking any prescription medications for pain 3 or more times per week?   NO, No RN review required.    Do you have a history of malignant hyperthermia?  No    (Females) Are you currently pregnant?   No     Have you been diagnosed or told you have pulmonary hypertension?   No    Do you have an LVAD?  No    Have you been told you have moderate to severe sleep apnea?  No.    Have you been told you have COPD, asthma, or any other lung disease?  No    Do you have any heart conditions?  No     Have you ever had or are you waiting for an organ transplant?  No. Continue scheduling, no site restrictions.    Have you had a stroke or transient ischemic attack (TIA aka \"mini stroke\" in the last 6 months?   No    Have you been diagnosed with or been told you have cirrhosis of the liver?   No.    Are you currently on dialysis?   No    Do you need assistance transferring?   No    BMI: Estimated body mass index is 22.63 kg/m  as " "calculated from the following:    Height as of 9/9/24: 1.651 m (5' 5\").    Weight as of 9/9/24: 61.7 kg (136 lb).     Is patients BMI > 40 and scheduling location [facility]?  No    Do you take an injectable or oral medication for weight loss or diabetes (excluding insulin)?  No    Do you take the medication Naltrexone?  No    Do you take blood thinners?  No       Prep   Are you currently on dialysis or do you have chronic kidney disease?  No    Do you have a diagnosis of diabetes?  No    Do you have a diagnosis of cystic fibrosis (CF)?  No    On a regular basis do you go 3 -5 days between bowel movements?  No    BMI > 40?  No    Preferred Pharmacy:      [id number] - [place], [place] - [address] [place] [address] [place] [address]  Phone: [phone] Fax: [phone]      Final Scheduling Details     Procedure scheduled  Colonoscopy    Surgeon:       Date of procedure:  12/16    Pre-OP / PAC:   No - Not required for this site.    Location  [facility] - Patient preference.    Sedation   Moderate Sedation - Per order.      Patient Reminders:   You will receive a call from a Nurse to review instructions and health history.  This assessment must be completed prior to your procedure.  Failure to complete the Nurse assessment may result in the procedure being cancelled.      On the day of your procedure, please designate an adult(s) who can drive you home stay with you for the next 24 hours. The medicines used in the exam will make you sleepy. You will not be able to drive.      You cannot take public transportation, ride share services, or non-medical taxi service without a responsible caregiver.  Medical transport services are allowed with the requirement that a responsible caregiver will receive you at your destination.  We require that drivers and caregivers are confirmed prior to your procedure.    "

## 2024-11-12 ENCOUNTER — TELEPHONE (OUTPATIENT)
Dept: GASTROENTEROLOGY | Facility: CLINIC | Age: 59
End: 2024-11-12
Payer: COMMERCIAL

## 2024-11-12 NOTE — TELEPHONE ENCOUNTER
Caller: Dilma Navarro     Reason for Reschedule/Cancellation   (please be detailed, any staff messages or encounters to note?): heading on vacation next day and wants to feel okay for it      Prior to reschedule please review:  Ordering Provider: Donna  Sedation Determined: mod  Does patient have any ASC Exclusions, please identify?: n      Notes on Cancelled Procedure:  Procedure: Lower Endoscopy [Colonoscopy]   Date: 12/16  Location: [facility]; [address] [place] [address]   Surgeon:       Rescheduled: Yes,   Procedure: Lower Endoscopy [Colonoscopy]    Date: 1/7/25   Location: [facility]; [address] [place] [address]    Surgeon: Db   Sedation Level Scheduled  mod ,  Reason for Sedation Level ordered   Instructions updated and sent: y     Does patient need PAC or Pre -Op Rescheduled? : no       Did you cancel or rescheduled an EUS procedure? No.

## 2025-07-13 ENCOUNTER — HEALTH MAINTENANCE LETTER (OUTPATIENT)
Age: 60
End: 2025-07-13